# Patient Record
Sex: MALE | Race: WHITE | Employment: OTHER | URBAN - METROPOLITAN AREA
[De-identification: names, ages, dates, MRNs, and addresses within clinical notes are randomized per-mention and may not be internally consistent; named-entity substitution may affect disease eponyms.]

---

## 2017-03-08 PROCEDURE — 36415 COLL VENOUS BLD VENIPUNCTURE: CPT | Performed by: INTERNAL MEDICINE

## 2017-03-08 PROCEDURE — 85025 COMPLETE CBC W/AUTO DIFF WBC: CPT | Performed by: INTERNAL MEDICINE

## 2017-03-08 PROCEDURE — 80061 LIPID PANEL: CPT | Performed by: INTERNAL MEDICINE

## 2017-03-08 PROCEDURE — 80053 COMPREHEN METABOLIC PANEL: CPT | Performed by: INTERNAL MEDICINE

## 2017-03-08 PROCEDURE — 84439 ASSAY OF FREE THYROXINE: CPT | Performed by: INTERNAL MEDICINE

## 2017-03-08 PROCEDURE — G0103 PSA SCREENING: HCPCS | Performed by: INTERNAL MEDICINE

## 2017-03-08 PROCEDURE — 84443 ASSAY THYROID STIM HORMONE: CPT | Performed by: INTERNAL MEDICINE

## 2017-08-25 PROCEDURE — 82784 ASSAY IGA/IGD/IGG/IGM EACH: CPT | Performed by: INTERNAL MEDICINE

## 2017-08-25 PROCEDURE — 89055 LEUKOCYTE ASSESSMENT FECAL: CPT | Performed by: INTERNAL MEDICINE

## 2017-08-25 PROCEDURE — 87045 FECES CULTURE AEROBIC BACT: CPT | Performed by: INTERNAL MEDICINE

## 2017-08-25 PROCEDURE — 87493 C DIFF AMPLIFIED PROBE: CPT | Performed by: INTERNAL MEDICINE

## 2017-08-25 PROCEDURE — 87046 STOOL CULTR AEROBIC BACT EA: CPT | Performed by: INTERNAL MEDICINE

## 2017-08-25 PROCEDURE — 86255 FLUORESCENT ANTIBODY SCREEN: CPT | Performed by: INTERNAL MEDICINE

## 2017-08-25 PROCEDURE — 83516 IMMUNOASSAY NONANTIBODY: CPT | Performed by: INTERNAL MEDICINE

## 2017-08-25 PROCEDURE — 87427 SHIGA-LIKE TOXIN AG IA: CPT | Performed by: INTERNAL MEDICINE

## 2017-08-28 PROBLEM — R19.7 DIARRHEA: Status: ACTIVE | Noted: 2017-08-28

## 2017-10-17 PROCEDURE — 81001 URINALYSIS AUTO W/SCOPE: CPT | Performed by: INTERNAL MEDICINE

## 2017-10-17 PROCEDURE — 80074 ACUTE HEPATITIS PANEL: CPT | Performed by: INTERNAL MEDICINE

## 2017-10-18 PROCEDURE — 87177 OVA AND PARASITES SMEARS: CPT | Performed by: INTERNAL MEDICINE

## 2017-10-18 PROCEDURE — 87045 FECES CULTURE AEROBIC BACT: CPT | Performed by: INTERNAL MEDICINE

## 2017-10-18 PROCEDURE — 87209 SMEAR COMPLEX STAIN: CPT | Performed by: INTERNAL MEDICINE

## 2017-10-18 PROCEDURE — 87272 CRYPTOSPORIDIUM AG IF: CPT | Performed by: INTERNAL MEDICINE

## 2017-10-18 PROCEDURE — 82705 FATS/LIPIDS FECES QUAL: CPT | Performed by: INTERNAL MEDICINE

## 2017-10-18 PROCEDURE — 87427 SHIGA-LIKE TOXIN AG IA: CPT | Performed by: INTERNAL MEDICINE

## 2017-10-18 PROCEDURE — 87329 GIARDIA AG IA: CPT | Performed by: INTERNAL MEDICINE

## 2017-10-18 PROCEDURE — 87046 STOOL CULTR AEROBIC BACT EA: CPT | Performed by: INTERNAL MEDICINE

## 2017-10-18 RX ORDER — PSEUDOEPHEDRINE HYDROCHLORIDE 30 MG/1
30 TABLET ORAL EVERY 4 HOURS PRN
COMMUNITY
End: 2017-11-01

## 2017-10-18 RX ORDER — FEXOFENADINE HCL 180 MG/1
180 TABLET ORAL DAILY
COMMUNITY
End: 2017-11-01

## 2017-10-22 NOTE — H&P
H and P Update    The history obtained by Dr. Berenice Vincent dated 10/17/17, has no changes.     GENERAL: well developed, well nourished, in no apparent distress  HEENT: atraumatic, normocephalic, ears and throat are clear  NECK: supple,

## 2017-10-22 NOTE — OPERATIVE REPORT
PATIENT NAME: Chance Summers  MRN: [id number]  DATE OF OPERATION: 10/23/2017  REFERRING PHYSICIAN: Dr. Anne Magaña  Medications:  Versed 6 mg IVP              Fentanyl 100 mcg IVP  DATE OF LAST COLONOSCOPY:  2014  PREOPERATIVE DIAGNOSIS                Diarrhea endoscopic findings. 2. The patient will be notified with biopsy results in 1 - 2 weeks. 3. May use imodium as needed. Continue fiber supplementation. 4. Follow up with Dr. Ian Cho if diarrhea persists.     Sav Farias MD, Wu Saucedo

## 2017-10-23 ENCOUNTER — HOSPITAL ENCOUNTER (OUTPATIENT)
Facility: HOSPITAL | Age: 72
Setting detail: HOSPITAL OUTPATIENT SURGERY
Discharge: HOME | End: 2017-10-23
Attending: INTERNAL MEDICINE | Admitting: INTERNAL MEDICINE
Payer: MEDICARE

## 2017-10-23 ENCOUNTER — SURGERY (OUTPATIENT)
Age: 72
End: 2017-10-23

## 2017-10-23 VITALS
BODY MASS INDEX: 24.64 KG/M2 | DIASTOLIC BLOOD PRESSURE: 83 MMHG | RESPIRATION RATE: 18 BRPM | TEMPERATURE: 99 F | HEIGHT: 71 IN | WEIGHT: 176 LBS | SYSTOLIC BLOOD PRESSURE: 114 MMHG | OXYGEN SATURATION: 95 % | HEART RATE: 40 BPM

## 2017-10-23 DIAGNOSIS — R19.7 DIARRHEA, UNSPECIFIED TYPE: ICD-10-CM

## 2017-10-23 PROCEDURE — 0DBE8ZX EXCISION OF LARGE INTESTINE, VIA NATURAL OR ARTIFICIAL OPENING ENDOSCOPIC, DIAGNOSTIC: ICD-10-PCS | Performed by: INTERNAL MEDICINE

## 2017-10-23 PROCEDURE — 88305 TISSUE EXAM BY PATHOLOGIST: CPT | Performed by: INTERNAL MEDICINE

## 2017-10-23 RX ORDER — MIDAZOLAM HYDROCHLORIDE 1 MG/ML
INJECTION INTRAMUSCULAR; INTRAVENOUS
Status: DISCONTINUED | OUTPATIENT
Start: 2017-10-23 | End: 2017-10-23

## 2017-10-23 RX ORDER — SODIUM CHLORIDE, SODIUM LACTATE, POTASSIUM CHLORIDE, CALCIUM CHLORIDE 600; 310; 30; 20 MG/100ML; MG/100ML; MG/100ML; MG/100ML
INJECTION, SOLUTION INTRAVENOUS CONTINUOUS
Status: DISCONTINUED | OUTPATIENT
Start: 2017-10-23 | End: 2017-10-23

## 2017-10-23 NOTE — PROGRESS NOTES
Dr. Marilee Norman here, MD saw elevated b/p readings. MD told wife not to worry and that he should follow up with primary care about it. MD ordered to discharge the pt home.

## 2017-10-23 NOTE — BRIEF OP NOTE
Pre-Operative Diagnosis: Diarrhea, unspecified type [R19.7]     Post-Operative Diagnosis: normal colon     Procedure Performed:   Procedure(s):  colonoscopy w bx    Surgeon(s) and Role:     * Marcia Small MD - Primary    Assistant(s):        Suhas Colon

## 2017-10-31 NOTE — PROGRESS NOTES
Results reviewed with pt on 10/30. Lymphocytic colitis may be related to drugs - nsaids and ppi's stopped. Pt remains on statin. This type of colitis may also be seen after severe infectious colitis - which pt had on recent trip to [facility].     Discussed

## 2017-11-01 PROCEDURE — 86753 PROTOZOA ANTIBODY NOS: CPT | Performed by: INTERNAL MEDICINE

## 2017-11-02 PROCEDURE — 87177 OVA AND PARASITES SMEARS: CPT | Performed by: INTERNAL MEDICINE

## 2017-11-02 PROCEDURE — 87272 CRYPTOSPORIDIUM AG IF: CPT | Performed by: INTERNAL MEDICINE

## 2017-11-02 PROCEDURE — 87015 SPECIMEN INFECT AGNT CONCNTJ: CPT | Performed by: INTERNAL MEDICINE

## 2017-11-02 PROCEDURE — 87046 STOOL CULTR AEROBIC BACT EA: CPT | Performed by: INTERNAL MEDICINE

## 2017-11-02 PROCEDURE — 89055 LEUKOCYTE ASSESSMENT FECAL: CPT | Performed by: INTERNAL MEDICINE

## 2017-11-02 PROCEDURE — 36415 COLL VENOUS BLD VENIPUNCTURE: CPT | Performed by: INTERNAL MEDICINE

## 2017-11-02 PROCEDURE — 87209 SMEAR COMPLEX STAIN: CPT | Performed by: INTERNAL MEDICINE

## 2017-11-02 PROCEDURE — 87207 SMEAR SPECIAL STAIN: CPT | Performed by: INTERNAL MEDICINE

## 2017-11-02 PROCEDURE — 87493 C DIFF AMPLIFIED PROBE: CPT | Performed by: INTERNAL MEDICINE

## 2017-11-02 PROCEDURE — 87045 FECES CULTURE AEROBIC BACT: CPT | Performed by: INTERNAL MEDICINE

## 2017-11-02 PROCEDURE — 87329 GIARDIA AG IA: CPT | Performed by: INTERNAL MEDICINE

## 2017-11-20 PROBLEM — A04.72 C. DIFFICILE COLITIS: Status: ACTIVE | Noted: 2017-11-20

## 2017-12-12 PROBLEM — K21.9 GASTROESOPHAGEAL REFLUX DISEASE WITHOUT ESOPHAGITIS: Status: ACTIVE | Noted: 2017-12-12

## 2017-12-12 PROBLEM — N18.30 CKD (CHRONIC KIDNEY DISEASE) STAGE 3, GFR 30-59 ML/MIN (HCC): Status: ACTIVE | Noted: 2017-12-12

## 2017-12-12 PROBLEM — R80.1 PERSISTENT PROTEINURIA: Status: ACTIVE | Noted: 2017-12-12

## 2017-12-12 PROBLEM — K52.832 LYMPHOCYTIC COLITIS: Status: ACTIVE | Noted: 2017-12-12

## 2017-12-12 PROBLEM — N17.9 AKI (ACUTE KIDNEY INJURY) (HCC): Status: ACTIVE | Noted: 2017-12-12

## 2017-12-12 PROBLEM — Z79.1 NSAID LONG-TERM USE: Status: ACTIVE | Noted: 2017-12-12

## 2017-12-12 PROBLEM — E55.9 HYPOVITAMINOSIS D: Status: ACTIVE | Noted: 2017-12-12

## 2017-12-12 PROBLEM — N18.2 CKD (CHRONIC KIDNEY DISEASE) STAGE 2, GFR 60-89 ML/MIN: Status: ACTIVE | Noted: 2017-12-12

## 2017-12-13 PROCEDURE — 82570 ASSAY OF URINE CREATININE: CPT | Performed by: INTERNAL MEDICINE

## 2017-12-13 PROCEDURE — 81003 URINALYSIS AUTO W/O SCOPE: CPT | Performed by: INTERNAL MEDICINE

## 2017-12-13 PROCEDURE — 82043 UR ALBUMIN QUANTITATIVE: CPT | Performed by: INTERNAL MEDICINE

## 2017-12-13 PROCEDURE — 84156 ASSAY OF PROTEIN URINE: CPT | Performed by: INTERNAL MEDICINE

## 2018-03-12 PROCEDURE — 81003 URINALYSIS AUTO W/O SCOPE: CPT | Performed by: INTERNAL MEDICINE

## 2018-03-12 PROCEDURE — 36415 COLL VENOUS BLD VENIPUNCTURE: CPT | Performed by: INTERNAL MEDICINE

## 2018-03-12 PROCEDURE — 82570 ASSAY OF URINE CREATININE: CPT | Performed by: INTERNAL MEDICINE

## 2018-03-12 PROCEDURE — 82043 UR ALBUMIN QUANTITATIVE: CPT | Performed by: INTERNAL MEDICINE
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
## 2018-05-01 ENCOUNTER — HOSPITAL ENCOUNTER (OUTPATIENT)
Dept: GENERAL RADIOLOGY | Age: 73
Discharge: HOME | End: 2018-05-01
Attending: ORTHOPAEDIC SURGERY
Payer: MEDICARE
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
## 2018-05-01 DIAGNOSIS — Z01.89 ENCOUNTER FOR LOWER EXTREMITY COMPARISON IMAGING STUDY: ICD-10-CM

## 2018-05-01 DIAGNOSIS — M25.562 LEFT KNEE PAIN: ICD-10-CM

## 2018-05-01 PROCEDURE — 73562 X-RAY EXAM OF KNEE 3: CPT | Performed by: ORTHOPAEDIC SURGERY

## 2018-05-01 PROCEDURE — 73630 X-RAY EXAM OF FOOT: CPT | Performed by: ORTHOPAEDIC SURGERY

## 2018-05-01 PROCEDURE — 73501 X-RAY EXAM HIP UNI 1 VIEW: CPT | Performed by: ORTHOPAEDIC SURGERY

## 2018-05-01 PROCEDURE — 73564 X-RAY EXAM KNEE 4 OR MORE: CPT | Performed by: ORTHOPAEDIC SURGERY

## 2018-05-21 ENCOUNTER — HOSPITAL ENCOUNTER (OUTPATIENT)
Dept: GENERAL RADIOLOGY | Facility: HOSPITAL | Age: 73
Discharge: HOME | End: 2018-05-21
Attending: ORTHOPAEDIC SURGERY
Payer: MEDICARE

## 2018-05-21 DIAGNOSIS — M79.671 RIGHT FOOT PAIN: ICD-10-CM
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
## 2018-05-21 DIAGNOSIS — M16.12 PRIMARY OSTEOARTHRITIS OF LEFT HIP: ICD-10-CM

## 2018-05-21 DIAGNOSIS — M17.12 PRIMARY OSTEOARTHRITIS OF LEFT KNEE: ICD-10-CM

## 2018-05-21 PROCEDURE — 20610 DRAIN/INJ JOINT/BURSA W/O US: CPT | Performed by: ORTHOPAEDIC SURGERY

## 2018-05-21 PROCEDURE — 77002 NEEDLE LOCALIZATION BY XRAY: CPT | Performed by: ORTHOPAEDIC SURGERY

## 2018-05-21 RX ORDER — METHYLPREDNISOLONE ACETATE 80 MG/ML
INJECTION, SUSPENSION INTRA-ARTICULAR; INTRALESIONAL; INTRAMUSCULAR; SOFT TISSUE
Status: COMPLETED
Start: 2018-05-21 | End: 2018-05-21

## 2018-05-21 RX ORDER — ROPIVACAINE HYDROCHLORIDE 5 MG/ML
INJECTION, SOLUTION EPIDURAL; INFILTRATION; PERINEURAL
Status: DISPENSED
Start: 2018-05-21 | End: 2018-05-21

## 2018-05-21 RX ADMIN — METHYLPREDNISOLONE ACETATE: 80 INJECTION, SUSPENSION INTRA-ARTICULAR; INTRALESIONAL; INTRAMUSCULAR; SOFT TISSUE at 08:30:00

## 2018-06-29 PROBLEM — N17.9 AKI (ACUTE KIDNEY INJURY) (HCC): Status: RESOLVED | Noted: 2017-12-12 | Resolved: 2018-06-29

## 2018-07-16 ENCOUNTER — LABORATORY ENCOUNTER (OUTPATIENT)
Dept: LAB | Facility: HOSPITAL | Age: 73
End: 2018-07-16
Attending: ORTHOPAEDIC SURGERY
Payer: MEDICARE

## 2018-07-16 ENCOUNTER — HOSPITAL ENCOUNTER (OUTPATIENT)
Dept: PHYSICAL THERAPY | Facility: HOSPITAL | Age: 73
Discharge: HOME | End: 2018-07-16
Attending: ORTHOPAEDIC SURGERY
Payer: MEDICARE

## 2018-07-16 DIAGNOSIS — Z01.818 PREOPERATIVE EXAMINATION: ICD-10-CM

## 2018-07-16 DIAGNOSIS — M16.12 PRIMARY OSTEOARTHRITIS OF LEFT HIP: ICD-10-CM

## 2018-07-16 DIAGNOSIS — N18.30 CKD (CHRONIC KIDNEY DISEASE) STAGE 3, GFR 30-59 ML/MIN (HCC): ICD-10-CM

## 2018-07-16 DIAGNOSIS — I10 ESSENTIAL HYPERTENSION: ICD-10-CM

## 2018-07-16 DIAGNOSIS — R80.1 PERSISTENT PROTEINURIA: ICD-10-CM

## 2018-07-16 LAB
ALBUMIN SERPL-MCNC: 3.6 G/DL (ref 3.5–4.8)
ALP LIVER SERPL-CCNC: 86 U/L (ref 45–117)
ALT SERPL-CCNC: 28 U/L (ref 17–63)
ANTIBODY SCREEN: NEGATIVE
AST SERPL-CCNC: 20 U/L (ref 15–41)
BASOPHILS # BLD AUTO: 0.03 X10(3) UL (ref 0–0.1)
BASOPHILS NFR BLD AUTO: 0.4 %
BILIRUB SERPL-MCNC: 0.4 MG/DL (ref 0.1–2)
BILIRUB UR QL STRIP.AUTO: NEGATIVE
BUN BLD-MCNC: 30 MG/DL (ref 8–20)
CALCIUM BLD-MCNC: 9.3 MG/DL (ref 8.3–10.3)
CHLORIDE: 106 MMOL/L (ref 101–111)
CLARITY UR REFRACT.AUTO: CLEAR
CO2: 29 MMOL/L (ref 22–32)
COLOR UR AUTO: YELLOW
CREAT BLD-MCNC: 1.47 MG/DL (ref 0.7–1.3)
CREAT UR-SCNC: 95.9 MG/DL
EOSINOPHIL # BLD AUTO: 0.22 X10(3) UL (ref 0–0.3)
EOSINOPHIL NFR BLD AUTO: 3.1 %
ERYTHROCYTE [DISTWIDTH] IN BLOOD BY AUTOMATED COUNT: 13.9 % (ref 11.5–16)
GLUCOSE BLD-MCNC: 87 MG/DL (ref 70–99)
GLUCOSE UR STRIP.AUTO-MCNC: NEGATIVE MG/DL
HCT VFR BLD AUTO: 44.2 % (ref 37–53)
HGB BLD-MCNC: 14.7 G/DL (ref 13–17)
IMMATURE GRANULOCYTE COUNT: 0.02 X10(3) UL (ref 0–1)
IMMATURE GRANULOCYTE RATIO %: 0.3 %
KETONES UR STRIP.AUTO-MCNC: NEGATIVE MG/DL
LEUKOCYTE ESTERASE UR QL STRIP.AUTO: NEGATIVE
LYMPHOCYTES # BLD AUTO: 1.46 X10(3) UL (ref 0.9–4)
LYMPHOCYTES NFR BLD AUTO: 20.5 %
M PROTEIN MFR SERPL ELPH: 7.6 G/DL (ref 6.1–8.3)
MCH RBC QN AUTO: 32.1 PG (ref 27–33.2)
MCHC RBC AUTO-ENTMCNC: 33.3 G/DL (ref 31–37)
MCV RBC AUTO: 96.5 FL (ref 80–99)
MONOCYTES # BLD AUTO: 0.66 X10(3) UL (ref 0.1–1)
MONOCYTES NFR BLD AUTO: 9.3 %
NEUTROPHIL ABS PRELIM: 4.72 X10 (3) UL (ref 1.3–6.7)
NEUTROPHILS # BLD AUTO: 4.72 X10(3) UL (ref 1.3–6.7)
NEUTROPHILS NFR BLD AUTO: 66.4 %
NITRITE UR QL STRIP.AUTO: NEGATIVE
PH UR STRIP.AUTO: 6 [PH] (ref 4.5–8)
PLATELET # BLD AUTO: 277 10(3)UL (ref 150–450)
POTASSIUM SERPL-SCNC: 3.7 MMOL/L (ref 3.6–5.1)
PROT UR STRIP.AUTO-MCNC: NEGATIVE MG/DL
PROT UR-MCNC: 15.5 MG/DL
RBC # BLD AUTO: 4.58 X10(6)UL (ref 3.8–5.8)
RBC UR QL AUTO: NEGATIVE
RED CELL DISTRIBUTION WIDTH-SD: 49.3 FL (ref 35.1–46.3)
RH BLOOD TYPE: NEGATIVE
SODIUM SERPL-SCNC: 142 MMOL/L (ref 136–144)
SP GR UR STRIP.AUTO: 1.01 (ref 1–1.03)
URINE PROTEIN/CREATININE RATIO, RANDOM: 0.16
UROBILINOGEN UR STRIP.AUTO-MCNC: <2 MG/DL
WBC # BLD AUTO: 7.1 X10(3) UL (ref 4–13)

## 2018-07-16 PROCEDURE — 87081 CULTURE SCREEN ONLY: CPT

## 2018-07-16 PROCEDURE — 36415 COLL VENOUS BLD VENIPUNCTURE: CPT

## 2018-07-16 PROCEDURE — 85025 COMPLETE CBC W/AUTO DIFF WBC: CPT

## 2018-07-16 PROCEDURE — 86900 BLOOD TYPING SEROLOGIC ABO: CPT

## 2018-07-16 PROCEDURE — 82570 ASSAY OF URINE CREATININE: CPT

## 2018-07-16 PROCEDURE — 86850 RBC ANTIBODY SCREEN: CPT

## 2018-07-16 PROCEDURE — 80053 COMPREHEN METABOLIC PANEL: CPT

## 2018-07-16 PROCEDURE — 84156 ASSAY OF PROTEIN URINE: CPT

## 2018-07-16 PROCEDURE — 81003 URINALYSIS AUTO W/O SCOPE: CPT

## 2018-07-16 PROCEDURE — 86901 BLOOD TYPING SEROLOGIC RH(D): CPT

## 2018-07-17 ENCOUNTER — ANESTHESIA EVENT (OUTPATIENT)
Dept: SURGERY | Facility: HOSPITAL | Age: 73
DRG: 470 | End: 2018-07-17
Payer: MEDICARE

## 2018-07-18 NOTE — PROGRESS NOTES
Results released to [facility] with providers notes previously  Pt has viewed [facility] results

## 2018-07-26 ENCOUNTER — APPOINTMENT (OUTPATIENT)
Dept: GENERAL RADIOLOGY | Facility: HOSPITAL | Age: 73
DRG: 470 | End: 2018-07-26
Attending: ORTHOPAEDIC SURGERY
Payer: MEDICARE

## 2018-07-26 ENCOUNTER — ANESTHESIA (OUTPATIENT)
Dept: SURGERY | Facility: HOSPITAL | Age: 73
DRG: 470 | End: 2018-07-26
Payer: MEDICARE

## 2018-07-26 ENCOUNTER — SURGERY (OUTPATIENT)
Age: 73
End: 2018-07-26

## 2018-07-26 ENCOUNTER — HOSPITAL ENCOUNTER (INPATIENT)
Facility: HOSPITAL | Age: 73
LOS: 2 days | Discharge: HOME HEALTH | DRG: 470 | End: 2018-07-28
Attending: ORTHOPAEDIC SURGERY | Admitting: ORTHOPAEDIC SURGERY
Payer: MEDICARE

## 2018-07-26 DIAGNOSIS — M16.12 PRIMARY OSTEOARTHRITIS OF LEFT HIP: Primary | ICD-10-CM

## 2018-07-26 LAB
CREAT BLD-MCNC: 1.5 MG/DL (ref 0.7–1.3)
T4 FREE SERPL-MCNC: 1 NG/DL (ref 0.9–1.8)
TSI SER-ACNC: 1.8 MIU/ML (ref 0.35–5.5)

## 2018-07-26 PROCEDURE — 88311 DECALCIFY TISSUE: CPT | Performed by: ORTHOPAEDIC SURGERY

## 2018-07-26 PROCEDURE — 88304 TISSUE EXAM BY PATHOLOGIST: CPT | Performed by: ORTHOPAEDIC SURGERY

## 2018-07-26 PROCEDURE — 0SRB04Z REPLACEMENT OF LEFT HIP JOINT WITH CERAMIC ON POLYETHYLENE SYNTHETIC SUBSTITUTE, OPEN APPROACH: ICD-10-PCS | Performed by: ORTHOPAEDIC SURGERY

## 2018-07-26 PROCEDURE — 97116 GAIT TRAINING THERAPY: CPT

## 2018-07-26 PROCEDURE — 84439 ASSAY OF FREE THYROXINE: CPT | Performed by: HOSPITALIST

## 2018-07-26 PROCEDURE — 3E0T3BZ INTRODUCTION OF ANESTHETIC AGENT INTO PERIPHERAL NERVES AND PLEXI, PERCUTANEOUS APPROACH: ICD-10-PCS | Performed by: ANESTHESIOLOGY

## 2018-07-26 PROCEDURE — 76942 ECHO GUIDE FOR BIOPSY: CPT | Performed by: ORTHOPAEDIC SURGERY

## 2018-07-26 PROCEDURE — 82565 ASSAY OF CREATININE: CPT | Performed by: PHYSICIAN ASSISTANT

## 2018-07-26 PROCEDURE — 84443 ASSAY THYROID STIM HORMONE: CPT | Performed by: HOSPITALIST

## 2018-07-26 PROCEDURE — 97161 PT EVAL LOW COMPLEX 20 MIN: CPT

## 2018-07-26 PROCEDURE — 73501 X-RAY EXAM HIP UNI 1 VIEW: CPT | Performed by: ORTHOPAEDIC SURGERY

## 2018-07-26 DEVICE — APEX HOLE ELIMINATOR - PS
Type: IMPLANTABLE DEVICE | Site: HIP | Status: FUNCTIONAL
Brand: APEX

## 2018-07-26 DEVICE — CORAIL HIP SYSTEM CEMENTLESS FEMORAL STEM 12/14 AMT 135 DEGREES KS SIZE 13 HA COATED STANDARD NO COLLAR
Type: IMPLANTABLE DEVICE | Site: HIP | Status: FUNCTIONAL
Brand: CORAIL

## 2018-07-26 DEVICE — PINNACLE HIP SOLUTIONS ALTRX POLYETHYLENE ACETABULAR LINER +4 NEUTRAL 36MM ID 56MM OD
Type: IMPLANTABLE DEVICE | Site: HIP | Status: FUNCTIONAL
Brand: PINNACLE ALTRX

## 2018-07-26 DEVICE — PINNACLE 100 ACETABULAR SHELL GRIPTION 100 56MM OD
Type: IMPLANTABLE DEVICE | Site: HIP | Status: FUNCTIONAL
Brand: PINNACLE GRIPTION

## 2018-07-26 DEVICE — BIOLOX DELTA CERAMIC FEMORAL HEAD +5.0 36MM DIA 12/14 TAPER
Type: IMPLANTABLE DEVICE | Site: HIP | Status: FUNCTIONAL
Brand: BIOLOX DELTA

## 2018-07-26 RX ORDER — NALOXONE HYDROCHLORIDE 0.4 MG/ML
80 INJECTION, SOLUTION INTRAMUSCULAR; INTRAVENOUS; SUBCUTANEOUS AS NEEDED
Status: DISCONTINUED | OUTPATIENT
Start: 2018-07-26 | End: 2018-07-26 | Stop reason: HOSPADM

## 2018-07-26 RX ORDER — MELATONIN
325
Status: DISCONTINUED | OUTPATIENT
Start: 2018-07-26 | End: 2018-07-28

## 2018-07-26 RX ORDER — BISACODYL 10 MG
10 SUPPOSITORY, RECTAL RECTAL
Status: DISCONTINUED | OUTPATIENT
Start: 2018-07-26 | End: 2018-07-28

## 2018-07-26 RX ORDER — DOCUSATE SODIUM 100 MG/1
100 CAPSULE, LIQUID FILLED ORAL 2 TIMES DAILY
Status: DISCONTINUED | OUTPATIENT
Start: 2018-07-26 | End: 2018-07-28

## 2018-07-26 RX ORDER — SODIUM CHLORIDE, SODIUM LACTATE, POTASSIUM CHLORIDE, CALCIUM CHLORIDE 600; 310; 30; 20 MG/100ML; MG/100ML; MG/100ML; MG/100ML
INJECTION, SOLUTION INTRAVENOUS CONTINUOUS
Status: DISCONTINUED | OUTPATIENT
Start: 2018-07-26 | End: 2018-07-27

## 2018-07-26 RX ORDER — AMLODIPINE BESYLATE 5 MG/1
10 TABLET ORAL DAILY
Status: DISCONTINUED | OUTPATIENT
Start: 2018-07-27 | End: 2018-07-28

## 2018-07-26 RX ORDER — FAMOTIDINE 20 MG/1
20 TABLET ORAL DAILY
Status: DISCONTINUED | OUTPATIENT
Start: 2018-07-27 | End: 2018-07-28

## 2018-07-26 RX ORDER — SODIUM CHLORIDE, SODIUM LACTATE, POTASSIUM CHLORIDE, CALCIUM CHLORIDE 600; 310; 30; 20 MG/100ML; MG/100ML; MG/100ML; MG/100ML
INJECTION, SOLUTION INTRAVENOUS CONTINUOUS
Status: DISCONTINUED | OUTPATIENT
Start: 2018-07-26 | End: 2018-07-26 | Stop reason: HOSPADM

## 2018-07-26 RX ORDER — DIPHENHYDRAMINE HYDROCHLORIDE 50 MG/ML
25 INJECTION INTRAMUSCULAR; INTRAVENOUS ONCE AS NEEDED
Status: ACTIVE | OUTPATIENT
Start: 2018-07-26 | End: 2018-07-26

## 2018-07-26 RX ORDER — ZOLPIDEM TARTRATE 5 MG/1
5 TABLET ORAL NIGHTLY PRN
Status: DISCONTINUED | OUTPATIENT
Start: 2018-07-26 | End: 2018-07-27

## 2018-07-26 RX ORDER — DEXAMETHASONE SODIUM PHOSPHATE 4 MG/ML
4 VIAL (ML) INJECTION AS NEEDED
Status: DISCONTINUED | OUTPATIENT
Start: 2018-07-26 | End: 2018-07-26 | Stop reason: HOSPADM

## 2018-07-26 RX ORDER — ONDANSETRON 2 MG/ML
4 INJECTION INTRAMUSCULAR; INTRAVENOUS EVERY 4 HOURS PRN
Status: DISPENSED | OUTPATIENT
Start: 2018-07-26 | End: 2018-07-28

## 2018-07-26 RX ORDER — OXYCODONE HYDROCHLORIDE 15 MG/1
15 TABLET ORAL EVERY 4 HOURS PRN
Status: DISCONTINUED | OUTPATIENT
Start: 2018-07-26 | End: 2018-07-27

## 2018-07-26 RX ORDER — ATORVASTATIN CALCIUM 40 MG/1
80 TABLET, FILM COATED ORAL NIGHTLY
Status: DISCONTINUED | OUTPATIENT
Start: 2018-07-26 | End: 2018-07-28

## 2018-07-26 RX ORDER — CEFAZOLIN SODIUM/WATER 2 G/20 ML
2 SYRINGE (ML) INTRAVENOUS EVERY 8 HOURS
Status: COMPLETED | OUTPATIENT
Start: 2018-07-26 | End: 2018-07-26

## 2018-07-26 RX ORDER — METOCLOPRAMIDE HYDROCHLORIDE 5 MG/ML
10 INJECTION INTRAMUSCULAR; INTRAVENOUS AS NEEDED
Status: DISCONTINUED | OUTPATIENT
Start: 2018-07-26 | End: 2018-07-26 | Stop reason: HOSPADM

## 2018-07-26 RX ORDER — ACETAMINOPHEN 325 MG/1
TABLET ORAL
Status: COMPLETED
Start: 2018-07-26 | End: 2018-07-26

## 2018-07-26 RX ORDER — TIZANIDINE 2 MG/1
2 TABLET ORAL 3 TIMES DAILY PRN
Status: DISCONTINUED | OUTPATIENT
Start: 2018-07-26 | End: 2018-07-28

## 2018-07-26 RX ORDER — ACETAMINOPHEN 325 MG/1
650 TABLET ORAL 4 TIMES DAILY
Status: DISPENSED | OUTPATIENT
Start: 2018-07-26 | End: 2018-07-28

## 2018-07-26 RX ORDER — SODIUM CHLORIDE 9 MG/ML
INJECTION, SOLUTION INTRAVENOUS CONTINUOUS
Status: DISCONTINUED | OUTPATIENT
Start: 2018-07-26 | End: 2018-07-27

## 2018-07-26 RX ORDER — MORPHINE SULFATE 4 MG/ML
2 INJECTION, SOLUTION INTRAMUSCULAR; INTRAVENOUS EVERY 2 HOUR PRN
Status: DISCONTINUED | OUTPATIENT
Start: 2018-07-26 | End: 2018-07-28

## 2018-07-26 RX ORDER — OXYCODONE HYDROCHLORIDE 5 MG/1
5 TABLET ORAL EVERY 4 HOURS PRN
Status: DISCONTINUED | OUTPATIENT
Start: 2018-07-26 | End: 2018-07-27

## 2018-07-26 RX ORDER — SODIUM PHOSPHATE, DIBASIC AND SODIUM PHOSPHATE, MONOBASIC 7; 19 G/133ML; G/133ML
1 ENEMA RECTAL ONCE AS NEEDED
Status: DISCONTINUED | OUTPATIENT
Start: 2018-07-26 | End: 2018-07-28

## 2018-07-26 RX ORDER — MORPHINE SULFATE 4 MG/ML
2 INJECTION, SOLUTION INTRAMUSCULAR; INTRAVENOUS EVERY 5 MIN PRN
Status: DISCONTINUED | OUTPATIENT
Start: 2018-07-26 | End: 2018-07-26 | Stop reason: HOSPADM

## 2018-07-26 RX ORDER — OXYCODONE HYDROCHLORIDE 10 MG/1
10 TABLET ORAL EVERY 4 HOURS PRN
Status: DISCONTINUED | OUTPATIENT
Start: 2018-07-26 | End: 2018-07-27

## 2018-07-26 RX ORDER — DIPHENHYDRAMINE HCL 25 MG
25 CAPSULE ORAL EVERY 4 HOURS PRN
Status: DISCONTINUED | OUTPATIENT
Start: 2018-07-26 | End: 2018-07-28

## 2018-07-26 RX ORDER — ACETAMINOPHEN 500 MG
1000 TABLET ORAL ONCE
Status: DISCONTINUED | OUTPATIENT
Start: 2018-07-26 | End: 2018-07-26 | Stop reason: HOSPADM

## 2018-07-26 RX ORDER — MORPHINE SULFATE 4 MG/ML
4 INJECTION, SOLUTION INTRAMUSCULAR; INTRAVENOUS EVERY 2 HOUR PRN
Status: DISCONTINUED | OUTPATIENT
Start: 2018-07-26 | End: 2018-07-28

## 2018-07-26 RX ORDER — METOCLOPRAMIDE HYDROCHLORIDE 5 MG/ML
10 INJECTION INTRAMUSCULAR; INTRAVENOUS EVERY 6 HOURS PRN
Status: ACTIVE | OUTPATIENT
Start: 2018-07-26 | End: 2018-07-28

## 2018-07-26 RX ORDER — LOSARTAN POTASSIUM 100 MG/1
100 TABLET ORAL DAILY
Status: DISCONTINUED | OUTPATIENT
Start: 2018-07-27 | End: 2018-07-28

## 2018-07-26 RX ORDER — ONDANSETRON 2 MG/ML
4 INJECTION INTRAMUSCULAR; INTRAVENOUS AS NEEDED
Status: DISCONTINUED | OUTPATIENT
Start: 2018-07-26 | End: 2018-07-26 | Stop reason: HOSPADM

## 2018-07-26 RX ORDER — HYDROCODONE BITARTRATE AND ACETAMINOPHEN 5; 325 MG/1; MG/1
1 TABLET ORAL AS NEEDED
Status: DISCONTINUED | OUTPATIENT
Start: 2018-07-26 | End: 2018-07-26 | Stop reason: HOSPADM

## 2018-07-26 RX ORDER — MORPHINE SULFATE 4 MG/ML
1 INJECTION, SOLUTION INTRAMUSCULAR; INTRAVENOUS EVERY 2 HOUR PRN
Status: DISCONTINUED | OUTPATIENT
Start: 2018-07-26 | End: 2018-07-28

## 2018-07-26 RX ORDER — POLYETHYLENE GLYCOL 3350 17 G/17G
17 POWDER, FOR SOLUTION ORAL DAILY PRN
Status: DISCONTINUED | OUTPATIENT
Start: 2018-07-26 | End: 2018-07-28

## 2018-07-26 RX ORDER — CEFAZOLIN SODIUM/WATER 2 G/20 ML
2 SYRINGE (ML) INTRAVENOUS ONCE
Status: COMPLETED | OUTPATIENT
Start: 2018-07-26 | End: 2018-07-26

## 2018-07-26 RX ORDER — HYDROCODONE BITARTRATE AND ACETAMINOPHEN 5; 325 MG/1; MG/1
2 TABLET ORAL AS NEEDED
Status: DISCONTINUED | OUTPATIENT
Start: 2018-07-26 | End: 2018-07-26 | Stop reason: HOSPADM

## 2018-07-26 RX ORDER — DIPHENHYDRAMINE HYDROCHLORIDE 50 MG/ML
12.5 INJECTION INTRAMUSCULAR; INTRAVENOUS EVERY 4 HOURS PRN
Status: DISCONTINUED | OUTPATIENT
Start: 2018-07-26 | End: 2018-07-28

## 2018-07-26 NOTE — PLAN OF CARE
Patient arrived to unit from PACU in bed. Fall and safety precautions in place. Welcomed and oriented to unit and POC. Spouse at bedside. Advancing diet, pain control, fall prevention, PT/OT discussed. Abductor pillow in place.  Patient with HR in the 40's

## 2018-07-26 NOTE — H&P
Delano Perea   7/5/2018 10:00 AM   Office Visit   MRN:  [id number]   Description: 74year old male Provider: Rich Peck MD Department: [facility]     Click to print [address] for [facility] Comment: Procedure: COLONOSCOPY;  Surgeon: Kelly Calderon MD;  Location: [facility] ENDOSCOPY  No date: HERNIA SURGERY  No date: MELANOMA MONITOR PROFILE  2010: OTHER SURGICAL HISTORY      Comment: Rt shoulder RCR  2009: OTHER SURGICAL HISTORY Helene Madera has stable history of cardiac or pulmonary conditions. He is a acceptable surgical candidate. This consult was sent back the referring physician, Dr. Dae Webster.      Assessment:  Diagnoses and all orders for this visit:     Preoperative

## 2018-07-26 NOTE — BRIEF OP NOTE
Pre-Operative Diagnosis: Primary osteoarthritis of left hip [M16.12]     Post-Operative Diagnosis: Primary osteoarthritis of left hip [M16.12]      Procedure Performed:   Procedure(s):  LEFT TOTAL HIP ARTHROPLASTY    Surgeon(s) and Role:     Debora Montgomery

## 2018-07-26 NOTE — ANESTHESIA PREPROCEDURE EVALUATION
PRE-OP EVALUATION    Patient Name: Elly Smith    Pre-op Diagnosis: Primary osteoarthritis of left hip [M16.12]    Procedure(s):  LEFT TOTAL HIP ARTHROPLASTY    Surgeon(s) and Role:     Tsering Markham MD - Primary    Pre-op vitals reviewed. COLONOSCOPY  5/1/2014: COLONOSCOPY      Comment: Procedure: COLONOSCOPY;  Surgeon: Socrates Walters MD;  Location: [address] [place] [address]  10/23/2017: COLONOSCOPY N/A      Comment: Procedure: COLONOSCOPY;  Surgeon: Socrates Waltesr MD; iliaca block, with the patient as outlined in the anesthesia consent and the peripheral nerve block procedure note. The paient understands, wishes to proceed and has no further questions. Spinal anesthesia discussed with possible need for GA backup.

## 2018-07-26 NOTE — INTERVAL H&P NOTE
Pre-op Diagnosis: Primary osteoarthritis of left hip [M16.12]    The above referenced H&P was reviewed by Halina Cheadle, MD on 7/26/2018, the patient was examined and no significant changes have occurred in the patient's condition since the H&P was perfo

## 2018-07-26 NOTE — PHYSICAL THERAPY NOTE
PHYSICAL THERAPY HIP EVALUATION - INPATIENT     Room Number: 376/376-A  Evaluation Date: 7/26/2018  Type of Evaluation: Initial  Physician Order: PT Eval and Treat    Presenting Problem: L RUTH ANN 7/26/18  Reason for Therapy: Mobility Dysfunction and Discharge L Lower Extremity: Weight Bearing as Tolerated    PAIN ASSESSMENT  Ratin  Location: L hip  Management Techniques: Activity promotion;Repositioning; Body mechanics    COGNITION  · Overall Cognitive Status:  WFL - within functional limits    RANGE OF stance time (step to gait pattern)  Stoop/Curb Assistance: Not tested       Skilled Therapy Provided: Pt presents semi-reclined in bed, agreeable to PT eval. Spouse present. Pt educated on RUTH ANN anterior hip precautions with MD specific no active ABD.  Pt ins limitations in independent bed mobility, transfers, gait and stair negotiation. The patient is below baseline and would benefit from skilled inpatient PT to address the above deficits to assist patient in returning to prior to level of function.   [id number] [address] Shields

## 2018-07-26 NOTE — ANESTHESIA POSTPROCEDURE EVALUATION
[address] [place], [place] [address] Patient Status:  Surgery Admit   Age/Gender 74year old male MRN [id number]   [facility] SURGERY Attending Maia Willoughby MD   Hosp Day # 0 PCP Mervin Britt MD       Anesthesia Post-op Note    Proced

## 2018-07-26 NOTE — OPERATIVE REPORT
PATIENT'S NAME: Sarthak Perea   ATTENDING PHYSICIAN: Yanira Miller MD   OPERATING PHYSICIAN: Yanira Miller MD   PATIENT ACCOUNT#:   [de-identified]    LOCATION:  [address]  MEDICAL RECORD #:   [id number]    YOB: 1945  [facility] trochanter and down in line with the vastus lateralis.   The tendon was subperiosteally elevated off the anterior aspect of the greater trochanter down to  capsule, which was incised in a T-type fashion, partially excising the capsule to gain access to the final head was impacted in place with good fixation. The final reduction was performed and the hip was put through good range of motion with good leg length and offset and no instability. The pin was removed from the pelvis.   Irrisept irrigation was plac

## 2018-07-27 PROBLEM — Z47.89 ORTHOPEDIC AFTERCARE: Status: ACTIVE | Noted: 2018-07-27

## 2018-07-27 LAB
ANION GAP SERPL CALC-SCNC: 9 MMOL/L (ref 0–18)
BUN BLD-MCNC: 25 MG/DL (ref 8–20)
BUN/CREAT SERPL: 18.7 (ref 10–20)
CALCIUM BLD-MCNC: 8.4 MG/DL (ref 8.3–10.3)
CHLORIDE SERPL-SCNC: 107 MMOL/L (ref 101–111)
CO2 SERPL-SCNC: 18 MMOL/L (ref 22–32)
CREAT BLD-MCNC: 1.34 MG/DL (ref 0.7–1.3)
ERYTHROCYTE [DISTWIDTH] IN BLOOD BY AUTOMATED COUNT: 13.7 % (ref 11.5–16)
GLUCOSE BLD-MCNC: 96 MG/DL (ref 70–99)
HCT VFR BLD AUTO: 35.8 % (ref 37–53)
HGB BLD-MCNC: 12.2 G/DL (ref 13–17)
MCH RBC QN AUTO: 32.1 PG (ref 27–33.2)
MCHC RBC AUTO-ENTMCNC: 34.1 G/DL (ref 31–37)
MCV RBC AUTO: 94.2 FL (ref 80–99)
OSMOLALITY SERPL CALC.SUM OF ELEC: 282 MOSM/KG (ref 275–295)
PLATELET # BLD AUTO: 244 10(3)UL (ref 150–450)
POTASSIUM SERPL-SCNC: 4.6 MMOL/L (ref 3.6–5.1)
RBC # BLD AUTO: 3.8 X10(6)UL (ref 3.8–5.8)
RED CELL DISTRIBUTION WIDTH-SD: 47 FL (ref 35.1–46.3)
SODIUM SERPL-SCNC: 134 MMOL/L (ref 136–144)
WBC # BLD AUTO: 12 X10(3) UL (ref 4–13)

## 2018-07-27 PROCEDURE — 97535 SELF CARE MNGMENT TRAINING: CPT

## 2018-07-27 PROCEDURE — 97165 OT EVAL LOW COMPLEX 30 MIN: CPT

## 2018-07-27 PROCEDURE — 97150 GROUP THERAPEUTIC PROCEDURES: CPT

## 2018-07-27 PROCEDURE — 97116 GAIT TRAINING THERAPY: CPT

## 2018-07-27 PROCEDURE — 85027 COMPLETE CBC AUTOMATED: CPT | Performed by: PHYSICIAN ASSISTANT

## 2018-07-27 PROCEDURE — 80048 BASIC METABOLIC PNL TOTAL CA: CPT | Performed by: ORTHOPAEDIC SURGERY

## 2018-07-27 RX ORDER — OXYCODONE HYDROCHLORIDE 5 MG/1
2.5 TABLET ORAL EVERY 4 HOURS PRN
Status: ACTIVE | OUTPATIENT
Start: 2018-07-27 | End: 2018-07-28

## 2018-07-27 RX ORDER — OXYCODONE HYDROCHLORIDE 10 MG/1
10 TABLET ORAL EVERY 4 HOURS PRN
Status: ACTIVE | OUTPATIENT
Start: 2018-07-27 | End: 2018-07-28

## 2018-07-27 RX ORDER — HYDROCODONE BITARTRATE AND ACETAMINOPHEN 10; 325 MG/1; MG/1
2 TABLET ORAL EVERY 4 HOURS PRN
Status: DISCONTINUED | OUTPATIENT
Start: 2018-07-28 | End: 2018-07-28

## 2018-07-27 RX ORDER — CALCIUM CARBONATE 200(500)MG
1000 TABLET,CHEWABLE ORAL
Status: DISCONTINUED | OUTPATIENT
Start: 2018-07-27 | End: 2018-07-28

## 2018-07-27 RX ORDER — HYDROCODONE BITARTRATE AND ACETAMINOPHEN 10; 325 MG/1; MG/1
1 TABLET ORAL EVERY 4 HOURS PRN
Status: DISCONTINUED | OUTPATIENT
Start: 2018-07-28 | End: 2018-07-28

## 2018-07-27 RX ORDER — OXYCODONE HYDROCHLORIDE 5 MG/1
5 TABLET ORAL EVERY 4 HOURS PRN
Status: DISPENSED | OUTPATIENT
Start: 2018-07-27 | End: 2018-07-28

## 2018-07-27 NOTE — CERTIFICATION
**Certification    PHYSICIAN Certification of Need for Inpatient Hospitalization    Based on the his current state of illness, Albino Hill requires inpatient hospitalization for his orthopedic surgery

## 2018-07-27 NOTE — PAYOR COMM NOTE
--------------  ADMISSION REVIEW       7/26      DIRECT FOR OR    OPERATIVE REPORT     PREOPERATIVE DIAGNOSIS:  Left hip primary osteoarthritis. POSTOPERATIVE DIAGNOSIS: Left hip primary osteoarthritis.   PROCEDURE PERFORMED:  Left total hip arthroplasty w

## 2018-07-27 NOTE — PROGRESS NOTES
[facility]  Orthopedic Surgery  Progress Note    Jose Rojas Patient Status:  Inpatient    1945 MRN [id number]   [facility] 3SW-A Attending Maurilio Cintron MD   [facility] Day # 1 PCP Renu Thomas MD     SUBJECTIVE:  INT 244.0      No results for input(s): PTP, INR in the last 168 hours. DIAGNOSTICS:       ASSESSMENT/PLAN:  1. Continue pain management  2. Continue DVT prophylaxis   3. Continue PT/OT  4. Discharge planning (home with health likely tomorrow)  5.  Continue

## 2018-07-27 NOTE — PROGRESS NOTES
[facility] Pharmacy Note:  Renal Dose Adjustment    Dewain Cowden Somerlot has been prescribed famotidine (PEPCID) 20 mg orally every 12 hours. Estimated Creatinine Clearance: 42.4 mL/min (A) (based on SCr of 1.5 mg/dL (H)).     His calculated creatinine clearance

## 2018-07-27 NOTE — OCCUPATIONAL THERAPY NOTE
OCCUPATIONAL THERAPY EVALUATION - INPATIENT     Room Number: 376/376-A  Evaluation Date: 7/27/2018  Type of Evaluation: Initial  Presenting Problem: s/p L RUTH ANN 7/26/18    Physician Order: IP Consult to Occupational Therapy  Reason for Therapy: ADL/IADL Dysf SUBJECTIVE   \"Using my cane really helped before surgery\"    Patient self-stated goal is to return to golfing.      OBJECTIVE  Precautions: RUTH ANN - anterior (No Active ABD)  Fall Risk: High fall risk    WEIGHT BEARING RESTRICTION  Weight Bearing Restric follow throughout session; education on LB dressing techniques/equipment, performed LB dressing donning underwear and shorts to knees SBA without AE, standing via RW and SBA, donning underwear and shorts to waist SBA with no loss of balance in standing; UB level of function.     Patient Complexity  Occupational Profile/Medical History LOW - Brief history including review of medical or therapy records    Specific performance deficits impacting engagement in ADL/IADL LOW  1 - 3 performance deficits    Client As

## 2018-07-27 NOTE — PROGRESS NOTES
Operative leg measured for tubigrip. Size E applied to left leg. Patient and wife instructed; verbalized understanding.

## 2018-07-27 NOTE — PHYSICAL THERAPY NOTE
PHYSICAL THERAPY HIP TREATMENT NOTE - INPATIENT      Room Number: 376/376-A     Session: 1 and 2  Number of Visits to Meet Established Goals: 3    Presenting Problem: L RUTH ANN 7/26/18    Problem List  Active Problems:    * No active hospital problems.  * standing up from a chair with arms (e.g., wheelchair, bedside commode, etc.): A Little   -   Moving from lying on back to sitting on the side of the bed?: A Little   How much help from another person does the patient currently need. ..   -   Moving to and f Curls 10 reps 15 reps   Forward, back steps 10 reps 15 reps   Short Squats 10 reps 15 reps     Comments:  Pt participated in group session, tolerance was good.    was present - yes   is a  - spouse    Patient End of Session: Up in chair;Needs met; #5     Patient verbalizes and/or demonstrates all precautions and safety concerns independently   Goal #6           Goal Comments: Goals established on 7/26/2018 - all goals ongoing as of 7/27/2018

## 2018-07-27 NOTE — CONSULTS
[facility] hospitalist initial consult note  PCP; Ivy Singh MD  Consulted at the request of Dr. Andreea Atkins  Reason for consult medical co-management    HPI 75 yo male with PMH including but not limited to  HTN, HL, OA here s/p Left total hip arthroplasty with Last attempt to quit: 1/1/1985    Smokeless tobacco: Former User    Alcohol use Yes  4.2 oz/week    7 Standard drinks or equivalent per week         Drug use: No    Sexual activity: Yes    Partners: Female     Other Topics Concern   None on file     Soc result in EPIC  =====  CONCLUSION:  Expected postoperative changes of left hip arthroplasty.           Assessment/plan      OA s/p Left total hip arthroplasty  Check CBC    HTN medication ordered  Monitor BMP and BP  Hold diuretic for now    Elevated creati

## 2018-07-27 NOTE — PROGRESS NOTES
[facility] hospitalist daily note  Patient was seen/examined on 7/27/18    S no chest pain, no SOB, no abd pain. Had indigestion today, tums ordered.  + flatus    medications in EPIC    PE 98.6 147/63  Gen: awake, alert, no respiratory distress  HEENT; mmm, anicte

## 2018-07-27 NOTE — CM/SW NOTE
07/27/18 1400   CM/SW Referral Data   Referral Source Physician   Reason for Referral Discharge planning   Informant Patient;Spouse   Pertinent Medical Hx   Primary Care Physician Name Devonda Lights   Patient Info   Patient's Mental Status Alert;Oriented

## 2018-07-28 VITALS
HEIGHT: 71 IN | HEART RATE: 60 BPM | TEMPERATURE: 99 F | OXYGEN SATURATION: 92 % | RESPIRATION RATE: 18 BRPM | SYSTOLIC BLOOD PRESSURE: 132 MMHG | WEIGHT: 155.31 LBS | BODY MASS INDEX: 21.74 KG/M2 | DIASTOLIC BLOOD PRESSURE: 59 MMHG

## 2018-07-28 LAB
ANION GAP SERPL CALC-SCNC: 7 MMOL/L (ref 0–18)
BASOPHILS # BLD AUTO: 0.03 X10(3) UL (ref 0–0.1)
BASOPHILS NFR BLD AUTO: 0.3 %
BUN BLD-MCNC: 19 MG/DL (ref 8–20)
BUN/CREAT SERPL: 15.4 (ref 10–20)
CALCIUM BLD-MCNC: 8.8 MG/DL (ref 8.3–10.3)
CHLORIDE SERPL-SCNC: 105 MMOL/L (ref 101–111)
CO2 SERPL-SCNC: 29 MMOL/L (ref 22–32)
CREAT BLD-MCNC: 1.23 MG/DL (ref 0.7–1.3)
EOSINOPHIL # BLD AUTO: 0.06 X10(3) UL (ref 0–0.3)
EOSINOPHIL NFR BLD AUTO: 0.5 %
ERYTHROCYTE [DISTWIDTH] IN BLOOD BY AUTOMATED COUNT: 13.9 % (ref 11.5–16)
ERYTHROCYTE [DISTWIDTH] IN BLOOD BY AUTOMATED COUNT: 13.9 % (ref 11.5–16)
GLUCOSE BLD-MCNC: 112 MG/DL (ref 70–99)
HCT VFR BLD AUTO: 34.8 % (ref 37–53)
HCT VFR BLD AUTO: 34.8 % (ref 37–53)
HGB BLD-MCNC: 11.8 G/DL (ref 13–17)
HGB BLD-MCNC: 11.8 G/DL (ref 13–17)
IMMATURE GRANULOCYTE COUNT: 0.05 X10(3) UL (ref 0–1)
IMMATURE GRANULOCYTE RATIO %: 0.4 %
LYMPHOCYTES # BLD AUTO: 1.78 X10(3) UL (ref 0.9–4)
LYMPHOCYTES NFR BLD AUTO: 15.1 %
MCH RBC QN AUTO: 32.1 PG (ref 27–33.2)
MCH RBC QN AUTO: 32.1 PG (ref 27–33.2)
MCHC RBC AUTO-ENTMCNC: 33.9 G/DL (ref 31–37)
MCHC RBC AUTO-ENTMCNC: 33.9 G/DL (ref 31–37)
MCV RBC AUTO: 94.6 FL (ref 80–99)
MCV RBC AUTO: 94.6 FL (ref 80–99)
MONOCYTES # BLD AUTO: 1.22 X10(3) UL (ref 0.1–1)
MONOCYTES NFR BLD AUTO: 10.4 %
NEUTROPHIL ABS PRELIM: 8.63 X10 (3) UL (ref 1.3–6.7)
NEUTROPHILS # BLD AUTO: 8.63 X10(3) UL (ref 1.3–6.7)
NEUTROPHILS NFR BLD AUTO: 73.3 %
OSMOLALITY SERPL CALC.SUM OF ELEC: 295 MOSM/KG (ref 275–295)
PLATELET # BLD AUTO: 231 10(3)UL (ref 150–450)
PLATELET # BLD AUTO: 231 10(3)UL (ref 150–450)
POTASSIUM SERPL-SCNC: 3.6 MMOL/L (ref 3.6–5.1)
RBC # BLD AUTO: 3.68 X10(6)UL (ref 3.8–5.8)
RBC # BLD AUTO: 3.68 X10(6)UL (ref 3.8–5.8)
RED CELL DISTRIBUTION WIDTH-SD: 48.2 FL (ref 35.1–46.3)
RED CELL DISTRIBUTION WIDTH-SD: 48.2 FL (ref 35.1–46.3)
SODIUM SERPL-SCNC: 141 MMOL/L (ref 136–144)
WBC # BLD AUTO: 11.8 X10(3) UL (ref 4–13)
WBC # BLD AUTO: 11.8 X10(3) UL (ref 4–13)

## 2018-07-28 PROCEDURE — 97150 GROUP THERAPEUTIC PROCEDURES: CPT

## 2018-07-28 PROCEDURE — 97535 SELF CARE MNGMENT TRAINING: CPT

## 2018-07-28 PROCEDURE — 80048 BASIC METABOLIC PNL TOTAL CA: CPT | Performed by: HOSPITALIST

## 2018-07-28 PROCEDURE — 97530 THERAPEUTIC ACTIVITIES: CPT

## 2018-07-28 PROCEDURE — 85025 COMPLETE CBC W/AUTO DIFF WBC: CPT | Performed by: HOSPITALIST

## 2018-07-28 PROCEDURE — 85027 COMPLETE CBC AUTOMATED: CPT | Performed by: PHYSICIAN ASSISTANT

## 2018-07-28 RX ORDER — POTASSIUM CHLORIDE 20 MEQ/1
40 TABLET, EXTENDED RELEASE ORAL EVERY 4 HOURS
Status: DISCONTINUED | OUTPATIENT
Start: 2018-07-28 | End: 2018-07-28

## 2018-07-28 NOTE — PROGRESS NOTES
[facility] hospitalist daily note    Seen/examined    S; no chest pain, no SOB, no abd pain, no nausea/emesis, + Bowel movement    Medications in EPIC    PE    07/28/18  0751   BP:    Pulse:    Resp: 18   Temp: 98.6 °F (37 °C)     Gen: awake, alert, no respirator

## 2018-07-28 NOTE — HOME CARE LIAISON
MET WITH PTNT AND OFFERED CHOICE  OF AGENCIES. PTNT AGREEABLE TO [facility]. MET WITH PTNT TO DISCUSS HOME HEALTH SERVICES AND COVERAGE CRITERIA. PTNT AGREEABLE TO [facility]. PTNT GIVEN RESIDENTIAL BROCHURE.  RESIDENTIAL WITH PROVIDE SN/PT ON DISC

## 2018-07-28 NOTE — CERTIFICATION
**Certification    PHYSICIAN Certification of Need for Inpatient Hospitalization    Based on the his current state of illness, Leanne Sunil requires inpatient hospitalization for his orthopedic surgery

## 2018-07-28 NOTE — CM/SW NOTE
07/28/18 1200   Discharge disposition   Expected discharge disposition Home-Health   Name of Facillity/Home Care/Hospice Residential     DC anticipated for today. [facility] aware.   Maddie Diaz, 07/28/18, 12:37 PM

## 2018-07-28 NOTE — PROGRESS NOTES
Acute Pain Service    Post Op Day 2 Ortho Note    Assessed patient in chair. Patient rates pain 2-3/10 at rest and 4/10 with activity. Patient states Juan Miguel Ortez is working well to manage pain; denies itching/nausea/dizziness.     Patient able to bear weight on s

## 2018-07-28 NOTE — OCCUPATIONAL THERAPY NOTE
OCCUPATIONAL THERAPY TREATMENT NOTE - INPATIENT     Room Number: 376/376-A  Session: 1   Number of Visits to Meet Established Goals: 0    Presenting Problem: s/p L RUTH ANN 7/26/18    History related to current admission:  Patient is 74year old male admitted o another person does the patient currently need…  -   Putting on and taking off regular lower body clothing?: None  -   Bathing (including washing, rinsing, drying)?: None  -   Toileting, which includes using toilet, bedpan or urinal? : None  -   Putting on Potential : Good    OT Goals:     ADL GOALS   Patient will perform Lower Body Dressing with supervision (just socks/shoes left to assess) MET  Patient will perform Toileting with supervision MET     FUNCTIONAL TRANSFER GOALS   Patient will transfer to Toil

## 2018-07-28 NOTE — PLAN OF CARE
PAIN - ADULT    • Verbalizes/displays adequate comfort level or patient's stated pain goal Progressing        Patient/Family Goals    • Patient/Family Short Term Goal Progressing        SAFETY ADULT - FALL    • Free from fall injury Progressing        Sitt

## 2018-07-28 NOTE — PROGRESS NOTES
[address] Patient Status:  Inpatient    1945 MRN [id number]   [facility] 3SW-A Attending Ciera Ambriz MD   Hosp Day # 2 PCP Lizbeth Klein MD     Subjective:  LeftTotal Hip Arthroplasty  Systemic or Spe

## 2018-07-28 NOTE — PHYSICAL THERAPY NOTE
PHYSICAL THERAPY HIP TREATMENT NOTE - INPATIENT      Room Number: 376/376-A     Session: 3/3  Number of Visits to Meet Established Goals: 3    Presenting Problem: s/p L THR    Problem List  Active Problems:    * No active hospital problems.  *      Past Med up from a chair with arms (e.g., wheelchair, bedside commode, etc.): None   -   Moving from lying on back to sitting on the side of the bed?: None   How much help from another person does the patient currently need. ..   -   Moving to and from a bed to a ch training  Rehab Potential : Good  Frequency (Obs): Daily    CURRENT GOALS     Goal #1  Patient is able to demonstrate supine - sit EOB @ level: supervision      Goal #2  Patient is able to demonstrate transfers Sit to/from Stand at assistance level: superv

## 2018-07-30 NOTE — DISCHARGE SUMMARY
Patient ID:  Jose Rojas  [id number]  74year old  6/6/1945    Admit Date: 7/26/2018    Discharge Date and Time: 7/28/2018     Attending Physician: Maurilio Cintron MD    Reason for admission: Primary osteoarthritis of left hip [M16.12]    Discharge (two) times daily. , Normal, Disp-52 tablet, R-0    HYDROcodone-acetaminophen (NORCO)  MG Oral Tab  1-2 tabs every 4 hours, prn, Normal, Disp-50 tablet, R-0        Follow-up with Robbi Sacks, MD in 2 weeks.      Daniel Camejo MD  7/30/2018  1:0

## 2018-08-10 PROBLEM — Z96.642 STATUS POST TOTAL REPLACEMENT OF LEFT HIP: Status: ACTIVE | Noted: 2018-08-10

## 2018-08-14 PROBLEM — M25.552 PAIN OF LEFT HIP JOINT: Status: ACTIVE | Noted: 2018-08-14

## 2019-01-08 PROCEDURE — 84153 ASSAY OF PSA TOTAL: CPT | Performed by: INTERNAL MEDICINE

## 2019-01-08 PROCEDURE — 84154 ASSAY OF PSA FREE: CPT | Performed by: INTERNAL MEDICINE

## 2019-01-08 PROCEDURE — 82570 ASSAY OF URINE CREATININE: CPT | Performed by: INTERNAL MEDICINE

## 2019-01-08 PROCEDURE — 82043 UR ALBUMIN QUANTITATIVE: CPT | Performed by: INTERNAL MEDICINE

## 2019-01-08 PROCEDURE — 81003 URINALYSIS AUTO W/O SCOPE: CPT | Performed by: INTERNAL MEDICINE

## 2019-01-09 PROBLEM — R97.20 ELEVATED PSA: Status: ACTIVE | Noted: 2019-01-09

## 2019-01-09 PROBLEM — I70.0 ATHEROSCLEROSIS OF AORTA (HCC): Status: ACTIVE | Noted: 2019-01-09

## 2019-05-13 PROBLEM — I77.1 TORTUOUS AORTA (HCC): Status: ACTIVE | Noted: 2019-05-13

## 2019-05-17 PROBLEM — S46.012A ROTATOR CUFF STRAIN, LEFT, INITIAL ENCOUNTER: Status: ACTIVE | Noted: 2019-05-17

## 2019-06-18 PROBLEM — M75.122 NONTRAUMATIC COMPLETE TEAR OF LEFT ROTATOR CUFF: Status: ACTIVE | Noted: 2019-06-18

## 2019-10-11 ENCOUNTER — HOSPITAL ENCOUNTER (OUTPATIENT)
Age: 74
Discharge: HOME | End: 2019-10-11
Payer: MEDICARE

## 2019-10-11 VITALS
SYSTOLIC BLOOD PRESSURE: 150 MMHG | OXYGEN SATURATION: 100 % | RESPIRATION RATE: 20 BRPM | HEART RATE: 52 BPM | DIASTOLIC BLOOD PRESSURE: 76 MMHG | TEMPERATURE: 98 F

## 2019-10-11 DIAGNOSIS — H10.32 ACUTE BACTERIAL CONJUNCTIVITIS OF LEFT EYE: Primary | ICD-10-CM

## 2019-10-11 PROCEDURE — 99204 OFFICE O/P NEW MOD 45 MIN: CPT

## 2019-10-11 PROCEDURE — 99213 OFFICE O/P EST LOW 20 MIN: CPT

## 2019-10-11 RX ORDER — SULFACETAMIDE SODIUM 100 MG/ML
2 SOLUTION/ DROPS OPHTHALMIC EVERY 4 HOURS
Qty: 1 BOTTLE | Refills: 0 | Status: SHIPPED | OUTPATIENT
Start: 2019-10-11 | End: 2019-10-21

## 2019-10-11 NOTE — ED PROVIDER NOTES
Patient Seen in: [address] [facility]      History   Patient presents with:  Eye Pain    Stated Complaint: eye redness    . 12-year-old male who presents to the emergency room with complaints of eye redness, tearing for the past couple days. status: Light Tobacco Smoker        Packs/day: 1.00        Years: 20.00        Pack years: 21        Quit date: 1985        Years since quittin.7      Smokeless tobacco: Former User    Alcohol use:  Yes      Alcohol/week: 7.0 standard drinks      T Pulmonary/Chest: Effort normal.   Neurological: He is alert and oriented to person, place, and time. Skin: Skin is warm and dry. Capillary refill takes less than 2 seconds. He is not diaphoretic. Psychiatric: He has a normal mood and affect.  His beha

## 2019-10-14 PROBLEM — I73.9 PVD (PERIPHERAL VASCULAR DISEASE) (HCC): Status: ACTIVE | Noted: 2019-10-14

## 2019-10-14 PROBLEM — N18.2 CKD (CHRONIC KIDNEY DISEASE) STAGE 2, GFR 60-89 ML/MIN: Status: RESOLVED | Noted: 2017-12-12 | Resolved: 2019-10-14
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
## 2019-10-14 PROBLEM — A04.72 C. DIFFICILE COLITIS: Status: RESOLVED | Noted: 2017-11-20 | Resolved: 2019-10-14

## 2019-10-14 PROBLEM — R19.7 DIARRHEA: Status: RESOLVED | Noted: 2017-08-28 | Resolved: 2019-10-14

## 2019-10-14 PROBLEM — S46.012A ROTATOR CUFF STRAIN, LEFT, INITIAL ENCOUNTER: Status: RESOLVED | Noted: 2019-05-17 | Resolved: 2019-10-14

## 2019-10-14 PROBLEM — Z47.89 ORTHOPEDIC AFTERCARE: Status: RESOLVED | Noted: 2018-07-27 | Resolved: 2019-10-14

## 2019-10-14 PROBLEM — M75.122 NONTRAUMATIC COMPLETE TEAR OF LEFT ROTATOR CUFF: Status: RESOLVED | Noted: 2019-06-18 | Resolved: 2019-10-14

## 2019-10-14 PROBLEM — M16.12 PRIMARY OSTEOARTHRITIS OF LEFT HIP: Status: RESOLVED | Noted: 2018-07-16 | Resolved: 2019-10-14

## 2019-10-14 PROBLEM — M25.552 PAIN OF LEFT HIP JOINT: Status: RESOLVED | Noted: 2018-08-14 | Resolved: 2019-10-14

## 2021-02-12 PROBLEM — D63.1 ANEMIA DUE TO STAGE 3 CHRONIC KIDNEY DISEASE (HCC): Status: ACTIVE | Noted: 2021-02-12

## 2021-02-12 PROBLEM — D63.1 ANEMIA DUE TO STAGE 3A CHRONIC KIDNEY DISEASE (HCC): Status: ACTIVE | Noted: 2021-02-12

## 2021-02-12 PROBLEM — N18.31 ANEMIA DUE TO STAGE 3A CHRONIC KIDNEY DISEASE (HCC): Status: ACTIVE | Noted: 2021-02-12

## 2021-02-12 PROBLEM — N18.30 ANEMIA DUE TO STAGE 3 CHRONIC KIDNEY DISEASE (HCC): Status: ACTIVE | Noted: 2021-02-12

## 2021-05-19 PROBLEM — M12.812 ROTATOR CUFF ARTHROPATHY OF LEFT SHOULDER: Status: ACTIVE | Noted: 2021-05-19

## 2021-08-27 PROBLEM — I12.9 HYPERTENSIVE KIDNEY DISEASE WITH CHRONIC KIDNEY DISEASE: Status: ACTIVE | Noted: 2021-08-27

## 2021-10-05 PROBLEM — M19.012 OSTEOARTHRITIS OF LEFT ACROMIOCLAVICULAR JOINT: Status: ACTIVE | Noted: 2021-10-05

## 2022-02-28 PROBLEM — Z79.4 TYPE 2 DIABETES MELLITUS WITH CHRONIC KIDNEY DISEASE, WITH LONG-TERM CURRENT USE OF INSULIN, UNSPECIFIED CKD STAGE (HCC): Status: ACTIVE | Noted: 2022-02-28

## 2022-02-28 PROBLEM — E11.22 TYPE 2 DIABETES MELLITUS WITH CHRONIC KIDNEY DISEASE, WITH LONG-TERM CURRENT USE OF INSULIN, UNSPECIFIED CKD STAGE (HCC): Status: ACTIVE | Noted: 2022-02-28

## 2022-03-01 PROBLEM — E11.22 TYPE 2 DIABETES MELLITUS WITH CHRONIC KIDNEY DISEASE, WITH LONG-TERM CURRENT USE OF INSULIN, UNSPECIFIED CKD STAGE (HCC): Status: RESOLVED | Noted: 2022-02-28 | Resolved: 2022-03-01

## 2022-03-01 PROBLEM — Z79.4 TYPE 2 DIABETES MELLITUS WITH CHRONIC KIDNEY DISEASE, WITH LONG-TERM CURRENT USE OF INSULIN, UNSPECIFIED CKD STAGE (HCC): Status: RESOLVED | Noted: 2022-02-28 | Resolved: 2022-03-01

## 2022-03-01 PROBLEM — R55 SYNCOPAL EPISODES: Status: RESOLVED | Noted: 2018-04-01 | Resolved: 2022-03-01

## 2022-03-01 PROBLEM — M12.812 ROTATOR CUFF ARTHROPATHY OF LEFT SHOULDER: Status: RESOLVED | Noted: 2021-05-19 | Resolved: 2022-03-01

## 2022-04-02 ENCOUNTER — HOSPITAL ENCOUNTER (OUTPATIENT)
Age: 77
Discharge: HOME | End: 2022-04-02
Payer: MEDICARE

## 2022-04-02 VITALS
DIASTOLIC BLOOD PRESSURE: 76 MMHG | TEMPERATURE: 97 F | HEART RATE: 57 BPM | WEIGHT: 185 LBS | OXYGEN SATURATION: 97 % | SYSTOLIC BLOOD PRESSURE: 155 MMHG | HEIGHT: 71 IN | RESPIRATION RATE: 16 BRPM | BODY MASS INDEX: 25.9 KG/M2

## 2022-04-02 DIAGNOSIS — H11.31 SUBCONJUNCTIVAL HEMORRHAGE OF RIGHT EYE: Primary | ICD-10-CM

## 2022-04-02 PROCEDURE — 99203 OFFICE O/P NEW LOW 30 MIN: CPT | Performed by: NURSE PRACTITIONER

## 2022-04-02 RX ORDER — TETRACAINE HYDROCHLORIDE 5 MG/ML
1 SOLUTION OPHTHALMIC ONCE
Status: COMPLETED | OUTPATIENT
Start: 2022-04-02 | End: 2022-04-02

## 2022-04-02 NOTE — ED INITIAL ASSESSMENT (HPI)
Pt here w/ right eye w/ redness to conjunctiva area. +itching. No pain. States left eye beginning to itch also.

## 2023-08-07 NOTE — PRE-SEDATION ASSESSMENT
Physician Pre-Sedation Assessment    Pre-Sedation Assessment:    Sedation History: Airway Assessed    Cardiac: normal S1, S2  Respiratory: breath sounds clear bilaterally   Abdomen: soft, BS (+), non-tender    ASA Classification: 2.  Patient with mild syste Received call from Nurse at [facility] requesting a verbal order to removal staples for the patient because her post operative visit with Dr. Sutton is not until 8/28; Surgery was on 07/19/2023. Verbal order was given for staple removal.

## 2024-08-27 RX ORDER — ALFUZOSIN HYDROCHLORIDE 10 MG/1
10 TABLET, EXTENDED RELEASE ORAL DAILY
COMMUNITY

## 2024-09-05 ENCOUNTER — LABORATORY ENCOUNTER (OUTPATIENT)
Dept: LAB | Age: 79
End: 2024-09-05
Attending: ORTHOPAEDIC SURGERY
Payer: MEDICARE

## 2024-09-05 DIAGNOSIS — M16.11 OSTEOARTHRITIS OF RIGHT HIP: ICD-10-CM

## 2024-09-05 LAB
ANTIBODY SCREEN: NEGATIVE
RH BLOOD TYPE: NEGATIVE

## 2024-09-05 PROCEDURE — 86900 BLOOD TYPING SEROLOGIC ABO: CPT

## 2024-09-05 PROCEDURE — 86901 BLOOD TYPING SEROLOGIC RH(D): CPT

## 2024-09-05 PROCEDURE — 86850 RBC ANTIBODY SCREEN: CPT

## 2024-09-13 ENCOUNTER — ORDER TRANSCRIPTION (OUTPATIENT)
Dept: PHYSICAL THERAPY | Facility: HOSPITAL | Age: 79
End: 2024-09-13

## 2024-09-13 DIAGNOSIS — Z96.641 STATUS POST TOTAL REPLACEMENT OF RIGHT HIP: Primary | ICD-10-CM

## 2024-09-19 ENCOUNTER — APPOINTMENT (OUTPATIENT)
Dept: GENERAL RADIOLOGY | Facility: HOSPITAL | Age: 79
End: 2024-09-19
Attending: ORTHOPAEDIC SURGERY
Payer: MEDICARE

## 2024-09-19 ENCOUNTER — ANESTHESIA EVENT (OUTPATIENT)
Dept: SURGERY | Facility: HOSPITAL | Age: 79
End: 2024-09-19
Payer: MEDICARE

## 2024-09-19 ENCOUNTER — HOSPITAL ENCOUNTER (OUTPATIENT)
Facility: HOSPITAL | Age: 79
Discharge: HOME HEALTH | End: 2024-09-20
Attending: ORTHOPAEDIC SURGERY | Admitting: ORTHOPAEDIC SURGERY
Payer: MEDICARE

## 2024-09-19 ENCOUNTER — APPOINTMENT (OUTPATIENT)
Dept: GENERAL RADIOLOGY | Facility: HOSPITAL | Age: 79
End: 2024-09-19
Attending: NURSE PRACTITIONER
Payer: MEDICARE

## 2024-09-19 ENCOUNTER — ANESTHESIA (OUTPATIENT)
Dept: SURGERY | Facility: HOSPITAL | Age: 79
End: 2024-09-19
Payer: MEDICARE

## 2024-09-19 DIAGNOSIS — M16.11 OSTEOARTHRITIS OF RIGHT HIP: Primary | ICD-10-CM

## 2024-09-19 PROCEDURE — 76000 FLUOROSCOPY <1 HR PHYS/QHP: CPT | Performed by: ORTHOPAEDIC SURGERY

## 2024-09-19 PROCEDURE — 97116 GAIT TRAINING THERAPY: CPT

## 2024-09-19 PROCEDURE — 0SR904Z REPLACEMENT OF RIGHT HIP JOINT WITH CERAMIC ON POLYETHYLENE SYNTHETIC SUBSTITUTE, OPEN APPROACH: ICD-10-PCS | Performed by: ORTHOPAEDIC SURGERY

## 2024-09-19 PROCEDURE — 73501 X-RAY EXAM HIP UNI 1 VIEW: CPT | Performed by: NURSE PRACTITIONER

## 2024-09-19 PROCEDURE — 97161 PT EVAL LOW COMPLEX 20 MIN: CPT

## 2024-09-19 PROCEDURE — 88304 TISSUE EXAM BY PATHOLOGIST: CPT | Performed by: ORTHOPAEDIC SURGERY

## 2024-09-19 PROCEDURE — 88311 DECALCIFY TISSUE: CPT | Performed by: ORTHOPAEDIC SURGERY

## 2024-09-19 DEVICE — BIOLOX DELTA CERAMIC FEMORAL HEAD +1.5 36MM DIA 12/14 TAPER
Type: IMPLANTABLE DEVICE | Site: HIP | Status: FUNCTIONAL
Brand: BIOLOX DELTA

## 2024-09-19 DEVICE — PINNACLE HIP SOLUTIONS ALTRX POLYETHYLENE ACETABULAR LINER +4 NEUTRAL 36MM ID 56MM OD
Type: IMPLANTABLE DEVICE | Site: HIP | Status: FUNCTIONAL
Brand: PINNACLE ALTRX

## 2024-09-19 DEVICE — APEX HOLE ELIMINATOR - PS
Type: IMPLANTABLE DEVICE | Site: HIP | Status: FUNCTIONAL
Brand: APEX

## 2024-09-19 DEVICE — PINNACLE 100 ACETABULAR SHELL GRIPTION 100 56MM OD
Type: IMPLANTABLE DEVICE | Site: HIP | Status: FUNCTIONAL
Brand: PINNACLE GRIPTION

## 2024-09-19 DEVICE — ACTIS DUOFIX HIP PROSTHESIS (FEMORAL STEM 12/14 TAPER CEMENTLESS SIZE 7 HIGH COLLAR)  CE
Type: IMPLANTABLE DEVICE | Site: HIP | Status: FUNCTIONAL
Brand: ACTIS

## 2024-09-19 RX ORDER — HYDROMORPHONE HYDROCHLORIDE 1 MG/ML
0.6 INJECTION, SOLUTION INTRAMUSCULAR; INTRAVENOUS; SUBCUTANEOUS EVERY 5 MIN PRN
Status: DISCONTINUED | OUTPATIENT
Start: 2024-09-19 | End: 2024-09-19 | Stop reason: HOSPADM

## 2024-09-19 RX ORDER — ACETAMINOPHEN AND CODEINE PHOSPHATE 300; 30 MG/1; MG/1
2 TABLET ORAL EVERY 4 HOURS PRN
Status: DISCONTINUED | OUTPATIENT
Start: 2024-09-19 | End: 2024-09-20

## 2024-09-19 RX ORDER — SODIUM CHLORIDE 9 MG/ML
INJECTION, SOLUTION INTRAVENOUS CONTINUOUS
Status: DISCONTINUED | OUTPATIENT
Start: 2024-09-19 | End: 2024-09-20

## 2024-09-19 RX ORDER — TRANEXAMIC ACID 10 MG/ML
1000 INJECTION, SOLUTION INTRAVENOUS ONCE
Status: COMPLETED | OUTPATIENT
Start: 2024-09-19 | End: 2024-09-19

## 2024-09-19 RX ORDER — FAMOTIDINE 20 MG/1
20 TABLET, FILM COATED ORAL 2 TIMES DAILY
COMMUNITY

## 2024-09-19 RX ORDER — OXYCODONE HYDROCHLORIDE 5 MG/1
5 TABLET ORAL EVERY 4 HOURS PRN
Status: DISCONTINUED | OUTPATIENT
Start: 2024-09-19 | End: 2024-09-20

## 2024-09-19 RX ORDER — ASPIRIN 325 MG
325 TABLET ORAL 2 TIMES DAILY
COMMUNITY
Start: 2024-09-10

## 2024-09-19 RX ORDER — ONDANSETRON 2 MG/ML
4 INJECTION INTRAMUSCULAR; INTRAVENOUS EVERY 6 HOURS PRN
Status: DISCONTINUED | OUTPATIENT
Start: 2024-09-19 | End: 2024-09-19 | Stop reason: HOSPADM

## 2024-09-19 RX ORDER — POLYETHYLENE GLYCOL 3350 17 G/17G
17 POWDER, FOR SOLUTION ORAL DAILY PRN
Status: DISCONTINUED | OUTPATIENT
Start: 2024-09-19 | End: 2024-09-20

## 2024-09-19 RX ORDER — DEXAMETHASONE SODIUM PHOSPHATE 10 MG/ML
8 INJECTION, SOLUTION INTRAMUSCULAR; INTRAVENOUS ONCE
Status: COMPLETED | OUTPATIENT
Start: 2024-09-20 | End: 2024-09-20

## 2024-09-19 RX ORDER — TRAMADOL HYDROCHLORIDE 50 MG/1
50 TABLET ORAL EVERY 12 HOURS SCHEDULED
Status: DISCONTINUED | OUTPATIENT
Start: 2024-09-19 | End: 2024-09-20

## 2024-09-19 RX ORDER — ACETAMINOPHEN AND CODEINE PHOSPHATE 300; 30 MG/1; MG/1
1 TABLET ORAL EVERY 4 HOURS PRN
Status: DISCONTINUED | OUTPATIENT
Start: 2024-09-19 | End: 2024-09-20

## 2024-09-19 RX ORDER — SODIUM CHLORIDE, SODIUM LACTATE, POTASSIUM CHLORIDE, CALCIUM CHLORIDE 600; 310; 30; 20 MG/100ML; MG/100ML; MG/100ML; MG/100ML
INJECTION, SOLUTION INTRAVENOUS CONTINUOUS
Status: DISCONTINUED | OUTPATIENT
Start: 2024-09-19 | End: 2024-09-19 | Stop reason: HOSPADM

## 2024-09-19 RX ORDER — HYDROMORPHONE HYDROCHLORIDE 1 MG/ML
0.4 INJECTION, SOLUTION INTRAMUSCULAR; INTRAVENOUS; SUBCUTANEOUS EVERY 5 MIN PRN
Status: DISCONTINUED | OUTPATIENT
Start: 2024-09-19 | End: 2024-09-19 | Stop reason: HOSPADM

## 2024-09-19 RX ORDER — ACETAMINOPHEN 325 MG/1
650 TABLET ORAL
Status: DISCONTINUED | OUTPATIENT
Start: 2024-09-19 | End: 2024-09-20

## 2024-09-19 RX ORDER — DOCUSATE SODIUM 100 MG/1
100 CAPSULE, LIQUID FILLED ORAL 2 TIMES DAILY
Status: DISCONTINUED | OUTPATIENT
Start: 2024-09-19 | End: 2024-09-20

## 2024-09-19 RX ORDER — OXYCODONE HYDROCHLORIDE 10 MG/1
10 TABLET ORAL EVERY 4 HOURS PRN
Status: DISCONTINUED | OUTPATIENT
Start: 2024-09-19 | End: 2024-09-20

## 2024-09-19 RX ORDER — BISACODYL 10 MG
10 SUPPOSITORY, RECTAL RECTAL
Status: DISCONTINUED | OUTPATIENT
Start: 2024-09-19 | End: 2024-09-20

## 2024-09-19 RX ORDER — PHENYLEPHRINE HCL 10 MG/ML
VIAL (ML) INJECTION AS NEEDED
Status: DISCONTINUED | OUTPATIENT
Start: 2024-09-19 | End: 2024-09-19 | Stop reason: SURG

## 2024-09-19 RX ORDER — MEPERIDINE HYDROCHLORIDE 25 MG/ML
12.5 INJECTION INTRAMUSCULAR; INTRAVENOUS; SUBCUTANEOUS AS NEEDED
Status: DISCONTINUED | OUTPATIENT
Start: 2024-09-19 | End: 2024-09-19 | Stop reason: HOSPADM

## 2024-09-19 RX ORDER — DIPHENHYDRAMINE HYDROCHLORIDE 50 MG/ML
12.5 INJECTION INTRAMUSCULAR; INTRAVENOUS EVERY 4 HOURS PRN
Status: DISCONTINUED | OUTPATIENT
Start: 2024-09-19 | End: 2024-09-20

## 2024-09-19 RX ORDER — HYDROMORPHONE HYDROCHLORIDE 1 MG/ML
0.2 INJECTION, SOLUTION INTRAMUSCULAR; INTRAVENOUS; SUBCUTANEOUS EVERY 2 HOUR PRN
Status: DISCONTINUED | OUTPATIENT
Start: 2024-09-19 | End: 2024-09-20

## 2024-09-19 RX ORDER — SENNOSIDES 8.6 MG
17.2 TABLET ORAL NIGHTLY
Status: DISCONTINUED | OUTPATIENT
Start: 2024-09-19 | End: 2024-09-20

## 2024-09-19 RX ORDER — ACETAMINOPHEN 325 MG/1
650 TABLET ORAL ONCE
Status: COMPLETED | OUTPATIENT
Start: 2024-09-19 | End: 2024-09-19

## 2024-09-19 RX ORDER — SODIUM CHLORIDE, SODIUM LACTATE, POTASSIUM CHLORIDE, CALCIUM CHLORIDE 600; 310; 30; 20 MG/100ML; MG/100ML; MG/100ML; MG/100ML
INJECTION, SOLUTION INTRAVENOUS CONTINUOUS
Status: DISCONTINUED | OUTPATIENT
Start: 2024-09-19 | End: 2024-09-20

## 2024-09-19 RX ORDER — HYDROMORPHONE HYDROCHLORIDE 1 MG/ML
0.4 INJECTION, SOLUTION INTRAMUSCULAR; INTRAVENOUS; SUBCUTANEOUS EVERY 2 HOUR PRN
Status: DISCONTINUED | OUTPATIENT
Start: 2024-09-19 | End: 2024-09-20

## 2024-09-19 RX ORDER — NALOXONE HYDROCHLORIDE 0.4 MG/ML
0.08 INJECTION, SOLUTION INTRAMUSCULAR; INTRAVENOUS; SUBCUTANEOUS AS NEEDED
Status: DISCONTINUED | OUTPATIENT
Start: 2024-09-19 | End: 2024-09-19 | Stop reason: HOSPADM

## 2024-09-19 RX ORDER — CELECOXIB 200 MG/1
200 CAPSULE ORAL DAILY
COMMUNITY
Start: 2024-09-10 | End: 2024-11-09

## 2024-09-19 RX ORDER — HYDROMORPHONE HYDROCHLORIDE 1 MG/ML
0.2 INJECTION, SOLUTION INTRAMUSCULAR; INTRAVENOUS; SUBCUTANEOUS EVERY 5 MIN PRN
Status: DISCONTINUED | OUTPATIENT
Start: 2024-09-19 | End: 2024-09-19 | Stop reason: HOSPADM

## 2024-09-19 RX ORDER — ACETAMINOPHEN AND CODEINE PHOSPHATE 300; 30 MG/1; MG/1
1 TABLET ORAL EVERY 4 HOURS PRN
COMMUNITY
Start: 2024-09-10

## 2024-09-19 RX ORDER — MIDAZOLAM HYDROCHLORIDE 1 MG/ML
INJECTION INTRAMUSCULAR; INTRAVENOUS AS NEEDED
Status: DISCONTINUED | OUTPATIENT
Start: 2024-09-19 | End: 2024-09-19 | Stop reason: SURG

## 2024-09-19 RX ORDER — ASPIRIN 325 MG
325 TABLET, DELAYED RELEASE (ENTERIC COATED) ORAL 2 TIMES DAILY
Status: DISCONTINUED | OUTPATIENT
Start: 2024-09-19 | End: 2024-09-20

## 2024-09-19 RX ORDER — FERROUS SULFATE 325(65) MG
325 TABLET ORAL DAILY
COMMUNITY
Start: 2024-09-10

## 2024-09-19 RX ORDER — METOCLOPRAMIDE HYDROCHLORIDE 5 MG/ML
10 INJECTION INTRAMUSCULAR; INTRAVENOUS EVERY 8 HOURS PRN
Status: DISCONTINUED | OUTPATIENT
Start: 2024-09-19 | End: 2024-09-19 | Stop reason: HOSPADM

## 2024-09-19 RX ORDER — PSEUDOEPHEDRINE HCL 30 MG
100 TABLET ORAL 2 TIMES DAILY
COMMUNITY
Start: 2024-09-10

## 2024-09-19 RX ORDER — FERROUS SULFATE 325(65) MG
325 TABLET, DELAYED RELEASE (ENTERIC COATED) ORAL
Status: DISCONTINUED | OUTPATIENT
Start: 2024-09-20 | End: 2024-09-20

## 2024-09-19 RX ORDER — DIPHENHYDRAMINE HCL 25 MG
25 CAPSULE ORAL EVERY 4 HOURS PRN
Status: DISCONTINUED | OUTPATIENT
Start: 2024-09-19 | End: 2024-09-20

## 2024-09-19 RX ORDER — METOCLOPRAMIDE HYDROCHLORIDE 5 MG/ML
10 INJECTION INTRAMUSCULAR; INTRAVENOUS EVERY 8 HOURS PRN
Status: DISCONTINUED | OUTPATIENT
Start: 2024-09-19 | End: 2024-09-20

## 2024-09-19 RX ORDER — ACETAMINOPHEN 500 MG
1000 TABLET ORAL ONCE
Status: DISCONTINUED | OUTPATIENT
Start: 2024-09-19 | End: 2024-09-19 | Stop reason: HOSPADM

## 2024-09-19 RX ORDER — SODIUM PHOSPHATE, DIBASIC AND SODIUM PHOSPHATE, MONOBASIC 7; 19 G/230ML; G/230ML
1 ENEMA RECTAL ONCE AS NEEDED
Status: DISCONTINUED | OUTPATIENT
Start: 2024-09-19 | End: 2024-09-20

## 2024-09-19 RX ORDER — CELECOXIB 200 MG/1
200 CAPSULE ORAL 2 TIMES DAILY
Status: DISCONTINUED | OUTPATIENT
Start: 2024-09-19 | End: 2024-09-20

## 2024-09-19 RX ORDER — ACETAMINOPHEN 325 MG/1
TABLET ORAL
Status: COMPLETED
Start: 2024-09-19 | End: 2024-09-19

## 2024-09-19 RX ORDER — DIPHENHYDRAMINE HYDROCHLORIDE 50 MG/ML
25 INJECTION INTRAMUSCULAR; INTRAVENOUS ONCE AS NEEDED
Status: ACTIVE | OUTPATIENT
Start: 2024-09-19 | End: 2024-09-19

## 2024-09-19 RX ORDER — ONDANSETRON 2 MG/ML
4 INJECTION INTRAMUSCULAR; INTRAVENOUS EVERY 6 HOURS PRN
Status: DISCONTINUED | OUTPATIENT
Start: 2024-09-19 | End: 2024-09-20

## 2024-09-19 RX ADMIN — PHENYLEPHRINE HCL 100 MCG: 10 MG/ML VIAL (ML) INJECTION at 09:43:00

## 2024-09-19 RX ADMIN — SODIUM CHLORIDE, SODIUM LACTATE, POTASSIUM CHLORIDE, CALCIUM CHLORIDE: 600; 310; 30; 20 INJECTION, SOLUTION INTRAVENOUS at 10:40:00

## 2024-09-19 RX ADMIN — TRANEXAMIC ACID: 10 INJECTION, SOLUTION INTRAVENOUS at 10:40:00

## 2024-09-19 RX ADMIN — PHENYLEPHRINE HCL 100 MCG: 10 MG/ML VIAL (ML) INJECTION at 09:25:00

## 2024-09-19 RX ADMIN — MIDAZOLAM HYDROCHLORIDE 2 MG: 1 INJECTION INTRAMUSCULAR; INTRAVENOUS at 09:07:00

## 2024-09-19 RX ADMIN — PHENYLEPHRINE HCL 100 MCG: 10 MG/ML VIAL (ML) INJECTION at 09:31:00

## 2024-09-19 RX ADMIN — TRANEXAMIC ACID 1000 MG: 10 INJECTION, SOLUTION INTRAVENOUS at 09:22:00

## 2024-09-19 NOTE — ANESTHESIA PREPROCEDURE EVALUATION
PRE-OP EVALUATION    Patient Name: Higinio Perea    Admit Diagnosis: OSTEOARTHRITIS RIGHT HIP    Pre-op Diagnosis: OSTEOARTHRITIS RIGHT HIP    RIGHT ANTERIOR TOTAL HIP ARTHROPLASTY    Anesthesia Procedure: RIGHT ANTERIOR TOTAL HIP ARTHROPLASTY (Right)    Surgeons and Role:     * Lombardi, John A, MD - Primary    Pre-op vitals reviewed.  Temp: 97.8 °F (36.6 °C)  Pulse: 54  Resp: 16  BP: 149/75  SpO2: 100 %  Body mass index is 26.79 kg/m².    Current medications reviewed.  Hospital Medications:   acetaminophen (Tylenol Extra Strength) tab 1,000 mg  1,000 mg Oral Once    lactated ringers infusion   Intravenous Continuous    tranexamic acid in sodium chloride 0.7% (Cyklokapron) 1000 mg/100mL infusion premix 1,000 mg  1,000 mg Intravenous Once    ceFAZolin (Ancef) 2g in 10mL IV syringe premix  2 g Intravenous Once    ceFAZolin (Ancef) 2 g/10mL IV syringe premix        clonidine/epinephrine/ropivacaine/ketorolac in 0.9% NaCl 60 mL pain cocktail syringe for hip arthroplasty   Infiltration Once (Intra-Op)       Outpatient Medications:     Medications Prior to Admission   Medication Sig Dispense Refill Last Dose    acetaminophen-codeine 300-30 MG Oral Tab Take 1 tablet by mouth every 4 (four) hours as needed.   post op    celecoxib 200 MG Oral Cap Take 1 capsule (200 mg total) by mouth daily.   post op    Ferrous Sulfate 325 (65 Fe) MG Oral Tab Take 1 tablet (325 mg total) by mouth daily.   post op    docusate sodium 100 MG Oral Cap Take 100 mg by mouth 2 (two) times daily.   post op    aspirin 325 MG Oral Tab Take 1 tablet (325 mg total) by mouth 2 (two) times daily.   post op    famotidine 20 MG Oral Tab Take 1 tablet (20 mg total) by mouth 2 (two) times daily.   9/19/2024 at 0430    alfuzosin ER 10 MG Oral Tablet 24 Hr Take 1 tablet (10 mg total) by mouth daily.   9/18/2024 at 1900    relugolix (ORGOVYX) 120 MG Oral Tab Take 1 tablet (120 mg total) by mouth daily.   9/19/2024 at 0430    IRBESARTAN 300 MG Oral Tab  TAKE 1 TABLET (300 MG TOTAL) BY MOUTH DAILY. 90 tablet 1 9/18/2024 at 0800    AMLODIPINE 10 MG Oral Tab TAKE 1 TABLET EVERY DAY 90 tablet 1 9/19/2024 at 0430    CHLORTHALIDONE 25 MG Oral Tab TAKE 1 TABLET (25 MG TOTAL) BY MOUTH DAILY. 90 tablet 3 9/19/2024 at 0430    rosuvastatin 20 MG Oral Tab Take 1 tablet (20 mg total) by mouth nightly. 90 tablet 1 9/18/2024 at 1900    acetaminophen 500 MG Oral Tab Take 1 tablet (500 mg total) by mouth every 6 (six) hours as needed for Pain.   Past Week    Cholecalciferol (VITAMIN D) 1000 UNITS Oral Cap Take 1 tablet by mouth daily.   9/9/2024    MULTIVITAMINS OR Take 1 tablet by mouth daily.   9/9/2024    SILDENAFIL CITRATE 100 MG Oral Tab Take 1 tablet (100 mg total) by mouth daily as needed for Erectile Dysfunction. 10 tablet 0        Allergies: Norco [hydrocodone-acetaminophen]      Anesthesia Evaluation    Patient summary reviewed.    Anesthetic Complications           GI/Hepatic/Renal      (+) GERD                           Cardiovascular                  (+) hypertension     (+) CAD                                Endo/Other                                  Pulmonary                           Neuro/Psych                                      Past Surgical History:   Procedure Laterality Date    Colonoscopy  2005    Colonoscopy  05/01/2014    Procedure: COLONOSCOPY;  Surgeon: Shiva Angel MD;  Location:  ENDOSCOPY    Colonoscopy N/A 10/23/2017    Procedure: COLONOSCOPY;  Surgeon: Shiva Angel MD;  Location:  ENDOSCOPY    Hernia surgery      Hip replacement surgery  06/26/2018    left     Melanoma monitor profile      Other surgical history  2010    Rt shoulder RCR    Other surgical history  2009    bilateral knee arthroscopies     Social History     Socioeconomic History    Marital status:    Tobacco Use    Smoking status: Former     Current packs/day: 0.00     Average packs/day: 1 pack/day for 20.0 years (20.0 ttl pk-yrs)     Types: Cigarettes     Start  date: 1965     Quit date: 1985     Years since quittin.7    Smokeless tobacco: Former   Substance and Sexual Activity    Alcohol use: Yes     Alcohol/week: 7.0 standard drinks of alcohol     Types: 7 Standard drinks or equivalent per week    Drug use: No    Sexual activity: Yes     Partners: Female     History   Drug Use No     Available pre-op labs reviewed.               Airway      Mallampati: I  Mouth opening: >3 FB  TM distance: > 6 cm  Neck ROM: full Cardiovascular    Cardiovascular exam normal.  Rhythm: regular  Rate: normal     Dental             Pulmonary    Pulmonary exam normal.                 Other findings              ASA: 2   Plan: MAC and spinal  NPO status verified and patient meets guidelines.          Plan/risks discussed with: patient                Present on Admission:  **None**

## 2024-09-19 NOTE — ANESTHESIA PROCEDURE NOTES
Spinal Block    Date/Time: 9/19/2024 9:07 AM    Performed by: Anthony Bennett MD  Authorized by: Anthony Bennett MD      General Information and Staff    Start Time:  9/19/2024 9:07 AM  End Time:  9/19/2024 9:14 AM  Anesthesiologist:  Anthony Bennett MD  Performed by:  Anesthesiologist  Patient Location:  OR  Site identification: surface landmarks    Preanesthetic Checklist: patient identified, IV checked, risks and benefits discussed, monitors and equipment checked, pre-op evaluation, timeout performed, anesthesia consent and sterile technique used      Procedure Details    Patient Position:  Sitting  Prep: ChloraPrep    Monitoring:  Cardiac monitor, heart rate and continuous pulse ox  Approach:  Midline  Location:  L3-4  Injection Technique:  Single-shot    Needle    Needle Type:  Sprotte  Needle Gauge:  24 G  Needle Length:  3.5 in    Assessment    Sensory Level:  T8  Events: clear CSF, CSF aspirated, well tolerated and blood negative      Additional Comments

## 2024-09-19 NOTE — CM/SW NOTE
09/19/24 1500   CM/SW Referral Data   Referral Source Physician   Reason for Referral Discharge planning   Informant EMR;Clinical Staff Member   Discharge Needs   Anticipated D/C needs Home health care   Choice of Post-Acute Provider   Informed patient of right to choose their preferred provider Yes   Patient/family choice [facility]     Protocol order received for surgery.  Pt is a 79 year old male admitted for R Anterior RUTH ANN.  He was set up with [facility] pre-operatively    / to remain available for support and/or discharge planning.     Monica Alcazar MBA MSN, RN CTL/  [id number]

## 2024-09-19 NOTE — ANESTHESIA POSTPROCEDURE EVALUATION
[facility]    [facility]:  Outpatient in a [facility]   Age/Gender 79 year old male MRN [id number]   Location [facility] SURGERY Attending Lombardi, John A, MD   Hosp Day # 0 PCP Madalyn Chamberlain MD       Anesthesia Post-op Note    RIGHT ANTERIOR TOTAL HIP ARTHROPLASTY    Procedure Summary       Date: 09/19/24 Room / Location:  MAIN OR 12 / [facility] MAIN OR    Anesthesia Start: 0907 Anesthesia Stop: 1040    Procedure: RIGHT ANTERIOR TOTAL HIP ARTHROPLASTY (Right: Hip) Diagnosis: (OSTEOARTHRITIS RIGHT HIP)    Surgeons: Lombardi, John A, MD Anesthesiologist: Anthony Bennett MD    Anesthesia Type: MAC, spinal ASA Status: 2            Anesthesia Type: MAC, spinal    Vitals Value Taken Time   /55 09/19/24 1041   Temp 98.3 °F (36.8 °C) 09/19/24 1041   Pulse 52 09/19/24 1041   Resp 16 09/19/24 1041   SpO2 99 % 09/19/24 1041       Patient Location: PACU    Anesthesia Type: spinal and MAC    Airway Patency: patent    Postop Pain Control: adequate    Mental Status: mildly sedated but able to meaningfully participate in the post-anesthesia evaluation    Nausea/Vomiting: none    Cardiopulmonary/Hydration status: stable euvolemic    Complications: no apparent anesthesia related complications    Postop vital signs: stable    Dental Exam: Unchanged from Preop    Patient to be discharged from PACU when criteria met.

## 2024-09-19 NOTE — PLAN OF CARE
NURSING ADMISSION NOTE      Patient admitted via bed.  Oriented to room.  Safety precautions initiated.  Bed in low position.  Call light in reach.  Wife at bedside.    Ivf infusing.  Right hip dressing aquacel clean, dry, intact.  Gel ice wrap to right hip.  Color, movement, sensation intact to ble.  2 person skin assessment completed with LW Pct.  Skin intact.  Instructed on plan of care, call for all asst needed, discomfort felt, call don't fall protocol.  Verbalized understanding.

## 2024-09-19 NOTE — DISCHARGE INSTRUCTIONS
Sometimes managing your health at home requires assistance.   The [facility] team has recognized your preference to use [facility].  They can be reached by phone at [phone].  The fax number for your reference is [phone].  A representative from the home health agency will contact you or your family to schedule your first visit.      \  Hip Replacement Discharge Instructions    Dear Patient,     [facility] cares about your progress with recovery following your joint replacement surgery.     300 days from your scheduled surgery, [facility] will send you a follow-up survey to help us understand how your surgery impacted your mobility, pain, and overall quality of life. Please make every effort to complete this survey. The information collected from this survey will be used by your physician to track your recovery.     Sincerely,     [facility] [place]    Activity    Bathing  No tub baths, pools, or saunas until cleared by surgeon (about 4-6 weeks because it takes that long for the incision on the skin to heal and be a barrier to prevent infection.)  When allowed to shower:      AQUACEL dressing is waterproof and does not require being covered before showering.  Pat dressing and surrounding skin dry after shower                      AQUACEL        MEDIPORE/COVERLET dressing is NOT waterproof and REQUIRES being covered with a waterproof barrier to keep the dressing and incision dry.  SARAN WRAP, GLAD WRAP, PRESS N SEAL WORK REALLY WELL BUT ANY PLASTIC WRAP WILL DO.  Do not wash incision.   Remove entire wrapping and old dressing (if Medipore/coverlet) after showering. Pat dry with a CLEAN TOWEL if necessary and cover incision with new Medipore/coverlet. For other types of dressings, follow surgeon’s orders.                  MEDIPORE/COVERLET            Driving  Do not drive until cleared by surgeon. This is usually four to six weeks after  surgery. Discuss this at follow-up office visit.   Not allowed while taking narcotic pain medication or muscle relaxants.    Sex  Usually allowed after four to six weeks - check with surgeon at your office visit.  Return to work  Usually allowed after four to six weeks. Discuss specific work activities with your surgeon.    Restrictions  For hip replacement surgery, follow instructions provided by physical therapy    No smoking  Avoid smoking. It is known to cause breathing problems and can decrease the rate of healing.    Incision care/Dressing changes  Wash hands before and after dressing changes.    FOR MEDIPORE/COVERLET DRESSINGS:  Change dressing daily using Medipore/coverlet once Aquacel (waterproof) dressing is removed (which is about 7 days after surgery). Patient should be standing or lying flat so dressing goes on smoothly.  (This dressing needed for hip patients because of location of incision-don’t want contamination from bathroom use)   Continue this until your first visit with your surgeon’s office.  There could be a small amount of redness around the staples or incision; this is normal.  Watch for increased redness, warmth, any odor, increased drainage or opening of the incision. A little clear yellow or blood tinged drainage is normal up to 2 weeks after surgery but it should be less every day until it stops.  Call physician if you notice any concerning changes.  Sutures/staples will be removed at first office visit (10 days- 3 weeks).                         MEDIPORE /COVERLET          Medication  Anticoagulants = blood thinners (Xarelto, Eliquis, Lovenox, Coumadin or Aspirin)  Pill or shot form depending on what your physician orders.   IF placed on Coumadin, you may also need lab work done for monitoring.  You will bleed easier and bruise easier while on these medications.     Usually you will be on a blood thinner for about 4-5 weeks.  Contact your physician if you have signs of bruising, nose  bleeds or blood in your urine. Use electric razors and soft toothbrushes only.  Do not take aspirin while taking blood thinners unless ordered by your physician.  Review anticoagulant education information sheet provided.    Discomfort  Surgical discomfort is normal for one to two months.  Have realistic goals and keep a positive outlook.  Keep pain manageable; pain should not disrupt your sleep or activities like getting out of bed or walking.  You may need pain medication regularly (every 4-6 hours) the first 2 weeks and then begin to decrease how often you are taking it.  Take pain medication as prescribed with food, especially before therapy, allowing 30-60 minutes to take effect.  Do not drink alcohol while on pain medication.  As you have less discomfort, decrease the amount of pain medication you take. Use plain Tylenol (acetaminophen) for less severe pain.  Some pain medications have Tylenol (acetaminophen) in them such as Norco and Percocet. Do NOT take Tylenol (acetaminophen) within 4 hours of a dose of these medications.  Apply ice  or cold therapy to surgical site for 20 minutes at least four times a day, especially after therapy. Be sure there is a thin cloth barrier between skin and ice or cold therapy.  Change position at least every 45 minutes while awake to avoid stiffness or increased discomfort.  Deep breathing and relaxation techniques and distractions can help!  If you focus on something else, you do not experience the pain the same. Take advantage of everything available to your to help control you discomfort.  Contact physician if discomfort does not respond to pain medication.    Body changes  Constipation is common with the use of narcotics.  Eat fiber rich foods and drink plenty of fluids.  Use stool softeners such as Colace or Senakot while on narcotics, and laxatives such as Miralax or Milk of Magnesia if needed.   An enema or suppository may be needed if above measures do not  work.    Prevention of infection and promotion of healing  Good hand washing is important. Everyone should wash their hands or use hand  as soon as they walk in your house-whether they live there or are visiting.  Keep bed linen/clothing freshly laundered.  Do not allow others or pets to touch your incision.  Avoid people that have colds or the flu.  Your surgeon may recommend that you take antibiotics before you undergo any dental or other invasive surgical procedures after your joint replacement. Speak with your physician about this at your post-op office visit.  Eat a balanced diet high in fiber and drink plenty of fluids.   Continue using incentive spirometry because narcotics make you sleepy so you may not take good deep breaths. We do not want you to get pneumonia.     Post op Office visits  Schedule 10 days to 3 weeks after surgery WITH SURGEON’s office.  Additional visits may need to be scheduled. Your physician will discuss this at first post-op office visit.  Schedule outpatient physical therapy per your surgeon’s orders.  Schedule one week follow up after surgery WITH PRIMARY CARE PHYSICIAN; review your medications over last 6 months.  Your body gets stressed by surgery and that stress can affect all your other health issues (such as high blood pressure, diabetes, CHF, afib, and asthma just to name a few).  We don’t want those other health issues to cause you to get readmitted to the hospital; much better for you to catch developing problems and prevent them from becoming larger ones.  BILL HOSE - IF ordered by your surgeon, wear these during the day and off at night.  Surgeon will tell you when you don’t need them anymore.    Notify your surgeon if you notice any of the following signs  Separation of incision line.  Increased redness, swelling, or warmth of skin around incision.  Increased or foul smelling drainage from incision  Red streaks on skin near incision.  Temperature >100.4F.  Increased  pain at incision not relieved by pain medication.    Signs of Possible Dislocation  Increased severe leg or groin pain  Turning in or out of surgical leg that is new  Increased numbness, tingling to leg  Inability to walk or put weight on your surgical leg        Signs of blood clot  Pain, excessive tenderness, redness, or swelling in leg or calf (other than incision site).    Go directly to the ER or CALL 911 if  you:  become short of breath  have chest pain  cough up blood  have unexplained anxiety with breathing   Traveling and Handicapped parking  Check with you surgeon when you are allowed to travel so you don’t set yourself up for greater chance of complications.  If traveling by car, get out to stretch every 2 hours.  This helps prevent stiffness.  You may need to do this any time you travel for the first year after surgery.  If traveling by plane, BEFORE you get into a security line, let them know that you had your hip replaced, as you will most likely set off the metal detector. The doctors no longer provide an identification card for this as they are easily copied. ALSO request a wheelchair the first year to board and get off a plane…this aids in priority seating and you should sit on the aisle or at the bulkhead where you can easily stretch your legs and get up to walk up and down the aisles…this helps prevent blood clots and stiffness.  TEMPORARY HANDICAP PARKING APPLICATION  (good for 3-6 months)  - At Surgeon or PCP visit, request they fill out the form, then go to [employer] (only time you do not wait in a long line there). Some [facility] offices provide the same service. (Rebeca Huang and [place] have this service; if you live in another [facility], you may check with them as well). You need space to open car doors to position yourself properly with walker to get in and out of your car safely; some parking spaces are  practically on top of each other and do not give you enough room.     SPECIAL   INSTRUCTIONS:  Spanda- hip replacement(single layer compression sleeve):     All patients should wear the compression sleeve until first follow up visit to surgeon’s office (about 2-3 weeks) and then check with surgeon if need to continue use.  Take off to shower. Best to keep on as much as possible, even at night, except when washing out.  Wash with mild soap and water; DRIP dry overnight- put back on before getting up for the day.  Use one long tube on the calf.   It should end up just below the back of the knee and the other end on the ball of the foot to expose the toes.   Makes sure it is NOT bunched up anywhere.  IF the Spanda- feels TOO tight, then REMOVE it and call your surgeon to let them know.

## 2024-09-19 NOTE — OPERATIVE REPORT
PATIENT'S NAME: Higinio Perea   ATTENDING PHYSICIAN: John A Lombardi, MD   OPERATING PHYSICIAN: John A Lombardi, MD   PATIENT ACCOUNT#:   [id number]    LOCATION:  [facility] SURGERY  MEDICAL RECORD #:   [id number]    YOB: 1945  ADMISSION DATE:  9/19/2024           OPERATION DATE:  9/19/2024     OPERATIVE REPORT     PREOPERATIVE DIAGNOSIS:  Right hip primary osteoarthritis.  POSTOPERATIVE DIAGNOSIS: Right hip primary osteoarthritis.  PROCEDURE PERFORMED:  Right anterior total hip arthroplasty with Actis size 7 high offset stem, 36 mm ceramic head with 1.5 neck, size 56 Gription 100 series cup with +4 polyethylene liner.     ASSISTANT:  JANES Banerjee    Skilled assistance was needed for patient positioning, prepping and draping, instrument holding and passing, retracting and suturing.     ANESTHESIA:  Spinal with regional block.      INDICATIONS:  This is a 79 yrs old White male who presents with ongoing pain and difficulties with the hip consistent with the above diagnosis with failed conservative care.  I reviewed the indications and benefits of the procedure.  The patient understood and agreed to proceed.     PROCEDURE:  The patient was taken to the operating room and placed in the supine position after administration of spinal anesthesia.  A regional block was performed at the lower extremity.    Patient was placed on the Hawks table in appropriate position with both lower extremities and the foot holders with appropriate padding.  A timeout was performed confirming the appropriate operative site, the patient received IV antibiotics preoperatively.  The hip was prepped and draped in the usual sterile fashion.  Preoperative x-rays were obtained using C arm to confirm appropriate pelvis position and obtain a baseline x-ray of the involved hip.  A knife was used to make a short longitudinal incision at the anterior aspect of the hip.  The Bovie was used for subcutaneous dissection and  hemostasis.  Dissection was carried down to the fascia of the tensor musculature and the knife used to incise the fascia in line with the skin incision.  A clamp was used with digital palpation to undermine the fascia and bluntly dissected the medial border of the tensor muscle.  Blunt dissection was then used to confirm anterior hip capsule.  The circumflex vessels of the inferior aspect of the wound were cauterized and divided.  After adequate exposure of the capsule the Bovie was used to incise the capsule in line with the femoral neck.  Tacking sutures were placed superiorly and inferiorly and the capsule divided gaining adequate exposure to the head neck region.  Once adequate release was performed, the saw was used to resect the femoral head at the appropriate level without difficulty.  Irricept irrigation was then placed into the wound allowed to sit for 1 minute before evacuating the solution.  At this point with tractors were placed anteriorly and posteriorly at the acetabulum and soft tissue dissection was carried out to the acetabular rim was well visualized.  The reamers were then used in a sequential fashion up to size 55 mm with good bleeding bone and the trial implant was impacted in place with good fixation.  C-arm was used to confirm appropriate alignment of the trial implant and that it was well-seated.  The trial cup was removed and the size 56 mm cup was impacted in place with good fixation in good position, again confirmed with C arm.  After placing the central hole eliminator the +4 polyethylene liner was placed with good fixation.  Attention was turned to the femur.  Appropriate soft tissue release was performed to mobilize the femur for adequate access.  The leg was placed appropriately in extension external rotation and adduction, the femoral hook was used to elevate the proximal femur appropriately with careful attention to the tension on the retractor.  The cookie cutter was then used to  gain lateral access to the canal.  The starting broach followed by the sequential broaches were used up to size 7 which was found to be the appropriate fit, confirming this with C arm, and the broach was left in place for trialing.  The calcar reamer was used.  A high offset neck and 1.5 head were placed and the hip was reduced.  The C-arm was then used to obtain AP pelvis and AP hip views which were compared to our preoperative x-rays to confirm appropriate length offset and implant position.  Stability was evaluated and found to be appropriate.  The hip was then dislocated and the trial implants removed.  The stem was placed down the canal and impacted with good fixation.  After cleaning the Sandhu taper the final head was placed and impacted with good fixation.  The hip was then reduced and final x-ray obtained.  Aricept irrigation was again placed in the wound allowed to sit for 1 minute and then the solution evacuated.  #2 Orthocord suture was used in a figure-of-eight fashion to close the remaining capsule.   Closure was then performed with a #1 Vicryl suture in a running fashion to close the fascia.  Subcutaneous tissue was closed with inverted 2-0 Vicryl sutures and the skin was closed with a running subcuticular 3-0 Monocryl suture.  A sterile dressing was placed.  The patient tolerated the procedure well.  There were no complications.  Blood loss was approximately 50 ml.  The patient was taken to Recovery in stable condition.

## 2024-09-19 NOTE — BRIEF OP NOTE
Pre-Operative Diagnosis: OSTEOARTHRITIS RIGHT HIP     Post-Operative Diagnosis: OSTEOARTHRITIS RIGHT HIP      Procedure Performed:   RIGHT ANTERIOR TOTAL HIP ARTHROPLASTY    Surgeons and Role:     * Lombardi, John A, MD - Primary    Assistant(s):  Surgical Assistant.: Roberto Styles: Arley Child APN     Surgical Findings: OA     Specimen: bone     Estimated Blood Loss: Blood Output: 50 mL (9/19/2024 10:17 AM)      Dictation Number:  [id number]    John A Lombardi, MD  9/19/2024  10:32 AM

## 2024-09-19 NOTE — PHYSICAL THERAPY NOTE
PHYSICAL THERAPY JOINT EVALUATION - INPATIENT     Room Number: 377/377-A  Evaluation Date: 9/19/2024  Type of Evaluation: Initial  Physician Order: PT Eval and Treat    Presenting Problem: s/p R ant RUTH ANN  Co-Morbidities : Mixed HLD, CKD3, GERD, s/p L RUTH ANN 2018, PVD  ,  prostate cancer  Reason for Therapy: Mobility Dysfunction and Discharge Planning    Procedure Performed 9/19/24 Dr Lombardi:   RIGHT ANTERIOR TOTAL HIP ARTHROPLASTY    PHYSICAL THERAPY ASSESSMENT   Patient is currently functioning near baseline with bed mobility, transfers, gait, stair negotiation, maintaining seated position, standing prolonged periods, and performing household tasks. Prior to admission, patient's baseline is indep.   Patient is requiring stand-by assist and contact guard assist as a result of the following impairments: decreased functional strength, decreased endurance/aerobic capacity, pain, impaired static and dynamic standing balance, impaired coordination, impaired motor planning, decreased muscular endurance, and limited RLE ROM.  Physical Therapy will continue to follow for duration of hospitalization.    Patient will benefit from continued skilled PT Services at discharge to promote prior level of function and safety with additional support and return home with home health PT.    PLAN  PT Treatment Plan: Bed mobility;Body mechanics;Coordination;Endurance;Energy conservation;Patient education;Family education;Gait training;Neuromuscular re-educate;Range of motion;Strengthening;Stoop training;Stair training;Transfer training;Balance training  Rehab Potential : Good  Frequency (Obs): Daily  Number of Visits to Meet Established Goals: 2    CURRENT GOALS  Goal #1  Patient is able to demonstrate supine - sit EOB @ level: supervision     Goal #2  Patient is able to demonstrate transfers Sit to/from Stand at assistance level: supervision   Goal #3    Patient is able to ambulate 150 feet with assistive device at assistance level:  supervision   Goal #4    Patient will negotiate 4 stairs/one curb w/ assistive device and supervision   Goal #5    Patient verbalizes and/or demonstrates all precautions and safety concerns independently    Goal #6      Goal Comments: Goals established on 9/19/2024    PHYSICAL THERAPY MEDICAL/SOCIAL HISTORY  History related to current admission: Patient is a 79 year old male admitted on 9/19/2024 from home for R ant RUTH ANN.    HOME SITUATION  Type of Home: House   Home Layout: One level  Stairs to Enter : 3  Railing: Yes          Lives With: Spouse  Drives: Yes  Patient Owned Equipment: Rolling walker (hip kit)  Patient Regularly Uses: Reading glasses    Prior Level of [place]:   Per pt lives in ranch style home with spouse (Brittany).Spouse able to assist as needed. Home with 3 steps to enter. Indep at baseline, but does own RW. No hx of falls. Owns hip kit. Retired . Enjoys golfing.   Spouse reports pt walks out-toed at baseline.      SUBJECTIVE  \"One of us has to be a smartass.\"    OBJECTIVE  Precautions: RUTH ANN - anterior;Bed/chair alarm (+ NO ACTIVE HIP ABD)  Fall Risk: Standard fall risk    WEIGHT BEARING RESTRICTION  Weight Bearing Restriction: R lower extremity        R Lower Extremity: Weight Bearing as Tolerated       PAIN ASSESSMENT  Rating: Unable to rate  Location: right hip  Management Techniques: Body mechanics;Activity promotion;Repositioning    COGNITION  Overall Cognitive Status:  WFL - within functional limits    RANGE OF MOTION AND STRENGTH ASSESSMENT  Upper extremity ROM and strength are within functional limits   Lower extremity ROM is within functional limits   Lower extremity strength is within functional limits     BALANCE  Static Sitting: Good  Dynamic Sitting: Fair +  Static Standing: Fair  Dynamic Standing: Fair -    ADDITIONAL TESTS                                    ACTIVITY TOLERANCE                         O2 WALK       NEUROLOGICAL FINDINGS                        AM-PAC '6-Clicks'  INPATIENT SHORT FORM - BASIC MOBILITY  How much difficulty does the patient currently have...  Patient Difficulty: Turning over in bed (including adjusting bedclothes, sheets and blankets)?: A Little   Patient Difficulty: Sitting down on and standing up from a chair with arms (e.g., wheelchair, bedside commode, etc.): A Little   Patient Difficulty: Moving from lying on back to sitting on the side of the bed?: A Little   How much help from another person does the patient currently need...   Help from Another: Moving to and from a bed to a chair (including a wheelchair)?: A Little   Help from Another: Need to walk in hospital room?: A Little   Help from Another: Climbing 3-5 steps with a railing?: A Little       AM-PAC Score:  Raw Score: 18   Approx Degree of Impairment: 46.58%   Standardized Score (AM-PAC Scale): 43.63   CMS Modifier (G-Code): CK    FUNCTIONAL ABILITY STATUS  Gait Assessment   Functional Mobility/Gait Assessment  Gait Assistance: Supervision;Contact guard assist  Distance (ft): 150  Assistive Device: Rolling walker  Pattern: R Decreased stance time    Skilled Therapy Provided    Bed Mobility:  Rolling: NT  Supine to sit: w/HOB elevated  supervision    Sit to supine: NT     Transfer Mobility:  Sit to stand: CGA to RW   Stand to sit: CGA  Gait = CGA progressing to supervision w/ rw X 150 feet, decr RLE stance time,  step to pattern, incr reliance BUE for offloading.    Therapist's Comments:   Patient presents sitting up in bed. Spouse present bedside. Discussed role and goal of physical therapy in hospital setting. Pt in agreement to session. Educated on anterior hip precautions + NO active hip abd and WBAT to RLE; handout provided. Pt verbalizes understanding.  Bed mobility with HOB elevated at supervision. Sitting EOB denies dizziness/lightheadedness. Sit to stand to RW CGA, v/c for hand placement. Ambulated 150 feet at CGA, progressing to supervision w/ RW x 150 feet- decr RLE stance time, step to  pattern, incr reliance BUE for offloading. Upright in chair at end of session. Discussed importance of continued out of bed mobility. Encouraged  continued ambulation with nursing staff 3-4 times daily. Encourage use of icec for pain and swelling management. Pt and spouse verbalize understanding. RN aware.    Exercise/Education Provided:  Bed mobility  Body mechanics  Energy conservation  Functional activity tolerated  Gait training  Posture  Transfer training    Patient End of Session: Up in chair;Needs met;Call light within reach;RN aware of session/findings;All patient questions and concerns addressed;SCDs in place;Ice applied;Alarm set;Discussed recommendations with /    Patient Evaluation Complexity Level:  History Moderate - 1 or 2 personal factors and/or co-morbidities   Examination of body systems Low -  addressing 1-2 elements   Clinical Presentation Low- Stable   Clinical Decision Making Low Complexity     PT Session Time: 30 minutes  Gait Training: 15 minutes

## 2024-09-19 NOTE — H&P
Office Visit  9/3/2024  Internal Medicine - [facility] [place]  Higinio Perea - 79 y.o. Male; born [birth year]Encounter Summary, generated on Sep. 19, 2024September 19, 2024   Reason for Visit    Reason for Visit -  Reason Comments   Pre-Op Patient is scheduled for right hip replacement on 9/19/24 with Dr. John Lombardi     Encounter Details    Encounter Details  Date Type Department Care Team (Latest Contact Info) Description   09/03/2024 12:00 PM CDT Office Visit Internal Medicine - [facility] [place]   [address]   [address]   [place] [address]   [phone]  Madalyn Chamberlain MD   [address]   [address]   [place] [address]   [phone] (Work)   [phone] (Fax)  Preop exam for internal medicine (Primary Dx)     Social History  - documented as of this encounter  Social History  Tobacco Use Types Packs/Day Years Used Date   Smoking Tobacco: Former Cigarettes 1 20 01/01/1965 - 01/01/1985   Smokeless Tobacco: Former       Quit: 2023     Social History  Alcohol Use Standard Drinks/Week Comments   Yes 7 (1 standard drink = 0.6 oz pure alcohol) a drink with dinner     Social History  Humiliation, Afraid, Rape, and Kick questionnaire Answer Date Recorded   Within the last year, have you been afraid of your partner or ex-partner? No 02/28/2022   Within the last year, have you been humiliated or emotionally abused in other ways by your partner or ex-partner? No 02/28/2022   Within the last year, have you been kicked, hit, slapped, or otherwise physically hurt by your partner or ex-partner? No 02/28/2022   Within the last year, have you been raped or forced to have any kind of sexual activity by your partner or ex-partner? No 02/28/2022     Social History  Social Connection and Isolation Panel [NHANES] Answer Date Recorded   In a typical week, how many times do you talk on the phone with family, friends, or neighbors? More than three times a week 02/28/2022   How  often do you get together with friends or relatives? More than three times a week 02/28/2022   How often do you attend Latter day or Protestant services? Never 02/28/2022   Do you belong to any clubs or organizations such as Latter day groups, unions, fraternal or athletic groups, or school groups? Yes 02/28/2022   How often do you attend meetings of the clubs or organizations you belong to? More than 4 times per year 02/28/2022   Are you , , , , never , or living with a partner?  02/28/2022     Social History  AUDIT-C Answer Date Recorded   Q1: How often do you have a drink containing alcohol? 4 or more times a week 02/28/2022   Q2: How many drinks containing alcohol do you have on a typical day when you are drinking? Patient declined 02/28/2022   Q3: How often do you have six or more drinks on one occasion? Patient declined 02/28/2022     Social History  Overall Financial Resource Strain (CARDIA) Answer Date Recorded   How hard is it for you to pay for the very basics like food, housing, medical care, and heating? Not hard at all 02/28/2022     Social History  PHQ-2 Answer Date Recorded   PHQ-2 SCORE 0 01/18/2024     Social History  [place] [employer] - Occupational Stress Questionnaire Answer Date Recorded   Do you feel stress - tense, restless, nervous, or anxious, or unable to sleep at night because your mind is troubled all the time - these days? Not at all 02/28/2022     Social History  Exercise Vital Sign Answer Date Recorded   On average, how many days per week do you engage in moderate to strenuous exercise (like a brisk walk)? 4 days 02/28/2022   On average, how many minutes do you engage in exercise at this level? 60 min 02/28/2022     Social History  Hunger Vital Sign Answer Date Recorded   Within the past 12 months, you worried that your food would run out before you got the money to buy more. Never true 02/28/2022   Within the past 12 months,  the food you bought just didn't last and you didn't have money to get more. Never true 02/28/2022     Social History  PRAPARE - Transportation Answer Date Recorded   In the past 12 months, has lack of transportation kept you from medical appointments or from getting medications? No 02/28/2022   In the past 12 months, has lack of transportation kept you from meetings, work, or from getting things needed for daily living? No 02/28/2022     Social History  Housing Stability Vital Sign Answer Date Recorded   In the last 12 months, was there a time when you were not able to pay the mortgage or rent on time? No 02/28/2022   In the last 12 months, how many places have you lived? 1 02/28/2022   In the last 12 months, was there a time when you did not have a steady place to sleep or slept in a shelter (including now)? No 02/28/2022     Social History  Sex and Gender Information Value Date Recorded   Sex Assigned at Birth Not on file     Gender Identity Not on file     Sexual Orientation Not on file       Last Filed Vital Signs  - documented in this encounter  Last Filed Vital Signs  Vital Sign Reading Time Taken Comments   Blood Pressure 126/70 09/03/2024 11:58 AM CDT     Pulse 80 09/03/2024 11:58 AM CDT     Temperature 36.6 °C (97.9 °F) 09/03/2024 11:58 AM CDT     Respiratory Rate - -     Oxygen Saturation 96% 09/03/2024 11:58 AM CDT     Inhaled Oxygen Concentration - -     Weight 86.7 kg (191 lb 3.2 oz) 09/03/2024 11:58 AM CDT     Height 180.3 cm (5' 11\") 09/03/2024 11:58 AM CDT     Body Mass Index 26.67 09/03/2024 11:58 AM CDT       Functional Status  - documented as of this encounter  Functional Status  Functional Status Response Date of Assessment   Hearing Problems? No 01/18/2024   Vision Problems? No 01/18/2024   Difficulty walking? No 01/18/2024   Difficulty dressing or bathing? No 01/18/2024   Problems with daily activities? No 01/18/2024     Functional Status  Cognitive Status Response Date of Assessment   Memory  Problems? No 01/18/2024     Progress Notes  - documented in this encounter  Table of Contents for Progress Notes   Madalyn Chamberlain MD - 09/03/2024 12:00 PM CDT   Matt Prabhakar CMA - 09/03/2024 12:00 PM CDT      Madalyn Chamberlain MD - 09/03/2024 12:00 PM CDT  Formatting of this note is different from the original.  Higinio Perea is a 79 year old male. Patient presents with:  Pre-Op: Patient is scheduled for right hip replacement on 9/19/24 with Dr. John Lombardi    Current Outpatient Medications  Medication Sig Dispense Refill  amLODIPine 10 MG Oral Tab TAKE 1 TABLET EVERY DAY 90 tablet 0  Meloxicam 15 MG Oral Tab Take 1 tablet (15 mg total) by mouth daily. 30 tablet 1  rosuvastatin 20 MG Oral Tab TAKE 1 TABLET EVERY NIGHT 100 tablet 1  CHLORTHALIDONE 25 MG Oral Tab TAKE 1 TABLET EVERY DAY 90 tablet 3  Mometasone Furoate 0.1 % External Cream Bid 60 g 3  Irbesartan 300 MG Oral Tab TAKE 1 TABLET EVERY  tablet 2  fluticasone propionate 50 MCG/ACT Nasal Suspension 2 sprays by Nasal route daily. 16 g 3  relugolix (ORGOVYX) 120 MG Oral Tab Take 1 tablet (120 mg total) by mouth daily. 30 tablet 11  alfuzosin ER 10 MG Oral Tablet 24 Hr Take 1 tablet (10 mg total) by mouth daily. 90 tablet 3  Sildenafil Citrate 100 MG Oral Tab Take 1 tablet (100 mg total) by mouth daily as needed for Erectile Dysfunction. 10 tablet 3  famotidine 40 MG Oral Tab Take 1 tablet (40 mg total) by mouth daily. 90 tablet 1  acetaminophen 500 MG Oral Tab Take 500 mg by mouth every 6 (six) hours as needed for Pain. Pt reports 2 pills BID  Cholecalciferol (VITAMIN D) 1000 UNITS Oral Cap 1 CAPSULE DAILY  MULTIVITAMINS OR daily      Norco [Hydrocodone-* RASH  Comment:Pt broke out in small rash  Past Medical History:  Diagnosis Date  Bladder cancer (HCC)  CKD (chronic kidney disease) stage 2, GFR 60-89 ml/min 12/12/2017  Disorder of kidney and ureter  High blood pressure  High cholesterol  Mixed hyperlipidemia  Nontraumatic complete tear of left  rotator cuff 2019  Osteoarthritis  left hip  Prostate cancer (HCC)  Skin cancer (melanoma) (HCC) 1980s  Syncopal episodes 04/2018  x 1  Unspecified essential hypertension  Visual impairment  glasses  Vitamin D deficiency 2011    Past Surgical History:  Procedure Laterality Date  ARTHROSCOPY OF JOINT UNLISTED Bilateral  knee  CATARACT Bilateral 2023  COLONOSCOPY 2005  COLONOSCOPY 2014  Procedure: COLONOSCOPY; Surgeon: Shiva Angel MD; Location:  ENDOSCOPY  COLONOSCOPY N/A 10/23/2017  Procedure: COLONOSCOPY; Surgeon: Shiva Angel MD; Location:  ENDOSCOPY  CYSTOSCOPY,INSERT URETERAL STENT 2023  HERNIA SURGERY  HIP REPLACEMENT SURGERY 2018  left  MELANOMA MONITOR PROFILE  OTHER SURGICAL HISTORY   Rt shoulder RCR  OTHER SURGICAL HISTORY   bilateral knee arthroscopies  OTHER SURGICAL HISTORY 2024  Dr. Little - Cystoscopy  TOTAL HIP REPLACEMENT Left    Family History  Problem Relation Age of Onset  Diabetes Father  Heart Disorder Father  Hypertension Father  Lipids Father  Lipids Mother  Heart Disorder Mother  mi@39    Social History  Socioeconomic History  Marital status:   Tobacco Use  Smoking status: Former  Packs/day: 0.00  Years: 1 pack/day for 20.0 years (20.0 ttl pk-yrs)  Types: Cigarettes  Start date: 1965  Quit date: 1985  Years since quittin.6  Smokeless tobacco: Former  Quit date:   Vaping Use  Vaping status: Never Used  Substance and Sexual Activity  Alcohol use: Yes  Alcohol/week: 7.0 standard drinks of alcohol  Types: 7 Standard drinks or equivalent per week  Comment: a drink with dinner  Drug use: No  Sexual activity: Yes  Partners: Female      REVIEW OF SYSTEMS:  GENERAL HEALTH: feels well otherwise, denies fever  SKIN: denies any unusual skin lesions or rashes  EYES: no visual complaints or deficits  HEENT: denies nasal congestion, sinus pain or sore throat; hearing loss negative  RESPIRATORY: denies shortness of breath,  wheezing or cough  CARDIOVASCULAR: denies chest pain or CAMERON; no palpitations  GI: denies nausea, vomiting, constipation, diarrhea; no rectal bleeding; no heartburn  GENITAL/: no dysuria, urgency or frequency; no hernias; no nocturia; no genital complains  MUSCULOSKELETAL: no joint complaints upper or lower extremities  NEURO: no sensory or motor complaint  PSYCHE: no symptoms of depression or anxiety  HEMATOLOGY: denies h/o anemia; denies bruising or excessive bleeding  ENDOCRINE: denies excessive thirst or urination; denies unexpected wt gain or wt loss  ALLERGY/IMM.: denies food or seasonal allergies    Immunization History  Administered Date(s) Administered  Covid-19 Vaccine Moderna 100 mcg/0.5 ml  02/11/2021 03/08/2021 03/11/2021  10/22/2021 04/04/2022  Covid-19 Vaccine Moderna 50 Mcg/0.5 ml (Spikevax) 12+  09/29/2023  Covid-19 Vaccine Moderna Bivalent 50 mcg/0.5 ml  10/12/2022  DTAP 04/28/2018  FLU VAC High Dose 65 YRS & Older PRSV Free (53411)  10/17/2017 09/17/2021 09/29/2023  FLUZONE 6 months and older PFS 0.5 ml (00671)  09/03/2020  Fluad 0.5 ml Quad PRSV Free 65+ (74670)  09/26/2022  Fluad 0.5 ml SDV 65yr+ (99323)  10/16/2018 10/17/2018  Fluzone Vaccine Medicare ()  12/11/2013 10/21/2015 11/04/2015  10/17/2017 10/03/2020  HIGH DOSE FLU 65 YRS AND OLDER PRSV FREE SINGLE D (35840) FLU CLINIC  10/22/2015 12/13/2016 10/07/2019  Hep A, Adult 09/29/2014 08/11/2015  Influenza 12/17/2007 01/22/2009 03/15/2010  10/08/2010 10/01/2012 10/03/2014  Influenza Virus Vaccine, Split  10/17/2011  Pneumococcal (Prevnar 13)  12/10/2014  Pneumovax 23 10/17/2011  RSV 09/29/2023  TDAP 12/17/2007 08/23/2017 08/12/2018  TYPHOID 09/29/2014 09/07/2017 09/09/2017  Typhoid,VI Polysacharide IM  09/29/2014 09/09/2017  Zoster Vaccine Live (Zostavax)  03/15/2010  Zoster Vaccine Recombinant Adjuvanted (Shingrix)  08/12/2018 10/17/2018 12/17/2018    EXAM:  /70  Pulse 80  Temp 97.9 °F (36.6 °C) (Oral)  Ht 5' 11\" (1.803 m)   Wt 191 lb 3.2 oz (86.7 kg)  SpO2 96%  BMI 26.67 kg/m²  GENERAL: well developed, well nourished, in no apparent distress  SKIN: no rashes, no suspicious lesions  HEENT: normocephalic; normal nose, pharynx and TM's  EYES: PERRLA, EOMI, sclera anicteric, conjunctiva normal; fundi normal  NECK: supple, FROM, no nodes, no JVD, no thyromegaly, no carotid bruits  BREAST: no masses, no nipple discharge, no nodes; normal skin  RESPIRATORY: clear to percussion and auscultation  CARDIOVASCULAR: S1, S2 normal, RRR; no S3, no S4; no click; murmur negative  ABDOMEN: normal active BS+, soft, nondistended; no HSM; no masses; no bruits; no masses; nontender  LYMPHATIC: no lymphadenopathy  MUSCULOSKELETAL: no acute synovitis upper or lower extremity  EXTREMITIES: no cyanosis, clubbing or edema, peripheral pulses intact  NEUROLOGIC: CN 2-12 intact; no sensorimotor deficit; reflexes normal  PSYCHIATRIC: alert and oriented x 3; affect appropriate    ASSESSMENT & PLAN:  Preoperative Physical Exam:    Higinio was examined today and is an acceptable risk to proceed with surgery.    Will obtain and evaluate preoperative labs and ECG.    Risks and benefits of surgery explained to patient. Including; Deep venous thrombosis, pulmonary embolus, bleeding, myocardial infarction, failure to relieve pain, wound infection, wound dehiscence, anesthesia complications, arrhythmia's etc.    Handout given regarding diagnosis and medication prescribed. Reviewed data with pt and pt verbalized understanding    PMH, PSH, SOCH AND FAMH reviewed.    CC: to referring physician Lombardi.  id#[id number]    Electronically signed by Madalyn Chamberlain MD at 09/05/2024 12:16 PM CDT

## 2024-09-19 NOTE — INTERVAL H&P NOTE
Pre-op Diagnosis: OSTEOARTHRITIS RIGHT HIP    The above referenced H&P was reviewed by John A Lombardi, MD on 9/19/2024, the patient was examined and no significant changes have occurred in the patient's condition since the H&P was performed.  I discussed with the patient and/or legal representative the potential benefits, risks and side effects of this procedure; the likelihood of the patient achieving goals; and potential problems that might occur during recuperation.  I discussed reasonable alternatives to the procedure, including risks, benefits and side effects related to the alternatives and risks related to not receiving this procedure.  We will proceed with procedure as planned.

## 2024-09-20 VITALS
BODY MASS INDEX: 26.9 KG/M2 | RESPIRATION RATE: 22 BRPM | OXYGEN SATURATION: 93 % | DIASTOLIC BLOOD PRESSURE: 67 MMHG | WEIGHT: 192.13 LBS | HEART RATE: 57 BPM | SYSTOLIC BLOOD PRESSURE: 138 MMHG | HEIGHT: 71 IN | TEMPERATURE: 98 F

## 2024-09-20 LAB
HCT VFR BLD AUTO: 32 %
HGB BLD-MCNC: 11.3 G/DL

## 2024-09-20 PROCEDURE — 97530 THERAPEUTIC ACTIVITIES: CPT

## 2024-09-20 PROCEDURE — 97116 GAIT TRAINING THERAPY: CPT

## 2024-09-20 PROCEDURE — 85018 HEMOGLOBIN: CPT | Performed by: NURSE PRACTITIONER

## 2024-09-20 PROCEDURE — 97110 THERAPEUTIC EXERCISES: CPT

## 2024-09-20 PROCEDURE — 85014 HEMATOCRIT: CPT | Performed by: NURSE PRACTITIONER

## 2024-09-20 PROCEDURE — 97535 SELF CARE MNGMENT TRAINING: CPT

## 2024-09-20 PROCEDURE — 97165 OT EVAL LOW COMPLEX 30 MIN: CPT

## 2024-09-20 NOTE — PROGRESS NOTES
AVS reviewed, IV dc'd, will dc home w/ [facility], discharge video viewed, verbalized understanding of dc instructions.

## 2024-09-20 NOTE — CONSULTS
General Medicine Consult      Reason for consult: post-op medical management right RUTH ANN    Consulted by: Dr. lombardi    PCP: Madalyn Chamberlain MD      History of Present Illness: Patient is a 79 year old male with PMH sig for prostate cancer, hypertension, hyperlipidemia, obstructive sleep apnea, CKD stage IIIa, presented for elective right RUTH ANN. Prior to procedure pt reports being in usual state of health with no recent fever, chills, CP, or SOB.  Pt currently reports 0/10 pain, worse with movement, relieved with pain medications. Denies nausea. Tolerating diet. No current complaints.    PMH:  Past Medical History:    Bladder cancer (HCC)    CKD (chronic kidney disease) stage 2, GFR 60-89 ml/min    Exposure to medical diagnostic radiation    prostate    High blood pressure    High cholesterol    Mixed hyperlipidemia    Nontraumatic complete tear of left rotator cuff    Osteoarthritis    left hip    Prostate cancer (HCC)    Renal disorder    Skin cancer (melanoma) (HCC)    Syncopal episodes    x 1     Unspecified essential hypertension    Visual impairment    glasses    Vitamin D deficiency        PSH:  Past Surgical History:   Procedure Laterality Date    Colonoscopy  2005    Colonoscopy  05/01/2014    Procedure: COLONOSCOPY;  Surgeon: Shiva Angel MD;  Location:  ENDOSCOPY    Colonoscopy N/A 10/23/2017    Procedure: COLONOSCOPY;  Surgeon: Shiva Angel MD;  Location:  ENDOSCOPY    Hernia surgery      Hip replacement surgery  06/26/2018    left     Melanoma monitor profile      Other surgical history  2010    Rt shoulder RCR    Other surgical history  2009    bilateral knee arthroscopies        HOME MEDS  Outpatient Medications Marked as Taking for the 9/19/24 encounter (Hospital Encounter)   Medication Sig Dispense Refill    acetaminophen-codeine 300-30 MG Oral Tab Take 1 tablet by mouth every 4 (four) hours as needed.      celecoxib 200 MG Oral Cap Take 1 capsule (200 mg total) by mouth daily.      Ferrous  Sulfate 325 (65 Fe) MG Oral Tab Take 1 tablet (325 mg total) by mouth daily.      docusate sodium 100 MG Oral Cap Take 100 mg by mouth 2 (two) times daily.      aspirin 325 MG Oral Tab Take 1 tablet (325 mg total) by mouth 2 (two) times daily.      famotidine 20 MG Oral Tab Take 1 tablet (20 mg total) by mouth 2 (two) times daily.      alfuzosin ER 10 MG Oral Tablet 24 Hr Take 1 tablet (10 mg total) by mouth daily.      relugolix (ORGOVYX) 120 MG Oral Tab Take 1 tablet (120 mg total) by mouth daily.      IRBESARTAN 300 MG Oral Tab TAKE 1 TABLET (300 MG TOTAL) BY MOUTH DAILY. 90 tablet 1    AMLODIPINE 10 MG Oral Tab TAKE 1 TABLET EVERY DAY 90 tablet 1    CHLORTHALIDONE 25 MG Oral Tab TAKE 1 TABLET (25 MG TOTAL) BY MOUTH DAILY. 90 tablet 3    rosuvastatin 20 MG Oral Tab Take 1 tablet (20 mg total) by mouth nightly. 90 tablet 1    acetaminophen 500 MG Oral Tab Take 1 tablet (500 mg total) by mouth every 6 (six) hours as needed for Pain.      Cholecalciferol (VITAMIN D) 1000 UNITS Oral Cap Take 1 tablet by mouth daily.      MULTIVITAMINS OR Take 1 tablet by mouth daily.         Scheduled Medication:    Continuous Infusing Medication:    PRN Medication:       ALL:  Allergies   Allergen Reactions    Norco [Hydrocodone-Acetaminophen] RASH     Pt broke out in small rash        Soc Hx:  Social History     Tobacco Use    Smoking status: Former     Current packs/day: 0.00     Average packs/day: 1 pack/day for 20.0 years (20.0 ttl pk-yrs)     Types: Cigarettes     Start date: 1965     Quit date: 1985     Years since quittin.7    Smokeless tobacco: Former   Substance Use Topics    Alcohol use: Yes     Alcohol/week: 7.0 standard drinks of alcohol     Types: 7 Standard drinks or equivalent per week        Fam Hx  Family History   Problem Relation Age of Onset    Diabetes Father     Heart Disorder Father     Hypertension Father     Lipids Father     Lipids Mother     Heart Disorder Mother         mi@39       Review of  Systems  Comprehensive ROS reviewed and negative except for what's stated above.  Including negative for chest pain, shortness of breath, syncope.       OBJECTIVE:  /67 (BP Location: Left arm)   Pulse 57   Temp 98.3 °F (36.8 °C) (Oral)   Resp 22   Ht 5' 11\" (1.803 m)   Wt 192 lb 1.6 oz (87.1 kg)   SpO2 93%   BMI 26.79 kg/m²     Wt Readings from Last 6 Encounters:   09/19/24 192 lb 1.6 oz (87.1 kg)   04/02/22 185 lb (83.9 kg)   03/23/22 188 lb (85.3 kg)   02/28/22 188 lb (85.3 kg)   08/30/21 185 lb (83.9 kg)   02/15/21 190 lb (86.2 kg)       General: alert and oriented, NAD  HEENT: normocephalic, MMM, no oropharyngeal lesions  Lungs: CTA, good effort  CV: nl S1/S2  GI: soft/NT/ND +BS  Ext: nonedematous b/l LE right with appropriate post-op dressing in place on right LE  Neuro: moving all extremities  Psych: normal mood, normal affect  Skin: no rashes, no lesions, with exception of post-op dressing in place/c/d/i      Diagnostics:   CBC/Chem  Recent Labs   Lab 09/20/24  0527   HGB 11.3*       No results for input(s): \"NA\", \"K\", \"CL\", \"CO2\", \"BUN\", \"CREATSERUM\", \"GLU\", \"CA\", \"CAION\", \"MG\", \"PHOS\" in the last 168 hours.    No results for input(s): \"ALT\", \"AST\", \"ALB\", \"AMYLASE\", \"LIPASE\", \"LDH\" in the last 168 hours.    Invalid input(s): \"ALPHOS\", \"TBIL\", \"DBIL\", \"TPROT\"        Radiology: XR FLUOROSCOPY C-ARM TIME LESS THAN 1 HOUR (CPT=76000)    Result Date: 9/19/2024  PROCEDURE:  XR FLUOROSCOPY C-ARM TIME <1 HOUR  (CPT=76000)  INDICATIONS:  Right hip arthroplasty  COMPARISON:  [facility] , XR, XR ASPIR/INJ MAJOR JOINT W/FLUORO LT(CPT=77002/94622), 5/21/2018, 8:42 AM.   TECHNIQUE:  Intraoperative fluoroscopic guidance  FLUOROSCOPY IMAGES OBTAINED:  6 FLUOROSCOPY TIME:  21 seconds TECHNOLOGIST TIME:  1 hour 30 minutes RADIATION DOSE (AIR KERMA PRODUCT):  1.2762mGy   FINDINGS:  Fluoroscopic guidance during right total hip arthroplasty            CONCLUSION:  Fluoroscopic guidance during total right hip  arthroplasty  LOCATION:  [facility]    Dictated by (CST): Norm Amezcua MD on 9/19/2024 at 12:49 PM     Finalized by (CST): Norm Amezcua MD on 9/19/2024 at 12:49 PM       XR HIP W OR WO PELVIS 1 VIEW, RIGHT (CPT=73501)    Result Date: 9/19/2024  PROCEDURE:  XR HIP W OR WO PELVIS 1 VIEW, RIGHT (CPT=73501)  TECHNIQUE:  Unilateral view of the hip.  COMPARISON:  None.  INDICATIONS:  Right hip replacement  PATIENT STATED HISTORY: (As transcribed by Technologist)  Post op image.    FINDINGS:  Postoperative changes of right hip arthroplasty with hardware in good alignment. Air within the subcutaneous soft tissues. Surgical staples along the lateral soft tissues. No acute fractures.  Vascular calcifications.            CONCLUSION:  Postoperative changes of right hip arthroplasty with hardware in good alignment.   LOCATION:  [facility]   Dictated by (CST): Mustapha Ny MD on 9/19/2024 at 12:17 PM     Finalized by (CST): Mustapha Ny MD on 9/19/2024 at 12:18 PM            ASSESSMENT / PLAN:    # s/p right RUTH ANN  -Postop antibiotics, fluids, DVT prophylaxis per Ortho spine  -Tolerating ambulation,  -Aspirin twice daily for DVT prophylaxis  - PT/OT    # Post-op pain   - Transition to PO pain medications  - Bowel regimen    # Hypertension  -Resume home medications including amlodipine and ARB    # History of prostate cancer    # CKD stage IIIa  -Follow-up outpatient creatinine    # Hyperlipidemia statin  -  Outpatient records or previous hospital records reviewed.     Appreciate this consultation. [facility] hospitalist to continue to follow patient while in house. Please call with any questions or concerns.     Tayla rodriguez MD   Internal Medicine  [facility] Hospitalist  Pager: [phone]

## 2024-09-20 NOTE — PHYSICAL THERAPY NOTE
PHYSICAL THERAPY TREATMENT NOTE - INPATIENT    Room Number: 377/377-A     Session: 1     Number of Visits to Meet Established Goals: 2    Presenting Problem: s/p R ant RUTH ANN  Co-Morbidities : Mixed HLD, CKD3, GERD, s/p L RUTH ANN 2018, PVD  ,  prostate cancer    ASSESSMENT   Patient demonstrates good  progress this session, goals updated to reflect patient performance.    Patient continues to function below baseline with bed mobility, transfers, gait, and stair negotiation. Contributing factors to remaining limitations include decreased functional strength and decreased muscular endurance.  Next session anticipate patient to progress gait and stair negotiation.  Physical Therapy will continue to follow patient for duration of hospitalization.    Patient continues to benefit from continued skilled PT services: at discharge to promote prior level of function and safety with additional support and return home with home health PT.    PLAN  PT Treatment Plan: Bed mobility;Body mechanics;Coordination;Endurance;Energy conservation;Patient education;Family education;Gait training;Neuromuscular re-educate;Range of motion;Strengthening;Stoop training;Stair training;Transfer training;Balance training  Rehab Potential : Good  Frequency (Obs): Daily    CURRENT GOALS       Goal #1  Patient is able to demonstrate supine - sit EOB @ level: supervision      Goal #2  Patient is able to demonstrate transfers Sit to/from Stand at assistance level: supervision   Goal #3     Patient is able to ambulate 150 feet with assistive device at assistance level: supervision  met   Goal #4     Patient will negotiate 4 stairs/one curb w/ assistive device and supervision   Goal #5     Patient verbalizes and/or demonstrates all precautions and safety concerns independently   Progressing    Goal #6        Goal Comments: Goals established on 9/19/2024 9/20/2024 all goals ongoing     SUBJECTIVE  \"It sounds like she is going to teach me how to type\" referring  to OT     OBJECTIVE  Precautions: RUTH ANN - anterior;Bed/chair alarm (+ NO ACTIVE HIP ABD)    WEIGHT BEARING RESTRICTION  Weight Bearing Restriction: R lower extremity        R Lower Extremity: Weight Bearing as Tolerated       PAIN ASSESSMENT   Ratin  Location: R hip  Management Techniques: Activity promotion;Body mechanics;Breathing techniques;Relaxation;Repositioning    BALANCE                                                                                                                       Static Sitting: Good  Dynamic Sitting: Fair +           Static Standing: Fair -  Dynamic Standing: Fair -    ACTIVITY TOLERANCE                         O2 WALK         AM-PAC '6-Clicks' INPATIENT SHORT FORM - BASIC MOBILITY  How much difficulty does the patient currently have...  Patient Difficulty: Turning over in bed (including adjusting bedclothes, sheets and blankets)?: None   Patient Difficulty: Sitting down on and standing up from a chair with arms (e.g., wheelchair, bedside commode, etc.): A Little   Patient Difficulty: Moving from lying on back to sitting on the side of the bed?: A Little   How much help from another person does the patient currently need...   Help from Another: Moving to and from a bed to a chair (including a wheelchair)?: None   Help from Another: Need to walk in hospital room?: A Little   Help from Another: Climbing 3-5 steps with a railing?: A Little       AM-PAC Score:  Raw Score: 20   Approx Degree of Impairment: 35.83%   Standardized Score (AM-PAC Scale): 47.67   CMS Modifier (G-Code): CJ    FUNCTIONAL ABILITY STATUS  Gait Assessment   Functional Mobility/Gait Assessment  Gait Assistance: Supervision  Distance (ft): 150x2  Assistive Device: Rolling walker  Pattern: R Decreased stance time  Stairs: Stairs;Car transfer  How Many Stairs: 4  Device: 1 Rail  Assist: Contact guard assist  Pattern: Ascend and Descend  Ascend and Descend : Side step  Car transfer: CGA    Skilled Therapy Provided  Pt  presents in semi sup  Therapist educated pt on hip precautions and implementations into functional mobility   Pt demos bed mobility from flat bed to simulate home setting   Pt gait trained and t/f trained as noted c cues for sequencing   Reviewed RUTH ANN HEP   Bed Mobility:  Rolling: nt    Supine<>Sit: supervision    Sit<>Supine: nt      Transfer Mobility:  Sit<>Stand: CGA cues for hand placement    Stand<>Sit: CGA    Gait: CGA to supervision c RW     Therapist's Comments: pt has RW for home use       THERAPEUTIC EXERCISES  Lower Extremity Heel raises  Knee extension  LAQ  Standing rocks, standing knee flexion      Upper Extremity Scapular Retraction     Position Sitting & Standing     Repetitions   10   Sets   1     Patient End of Session: Up in chair;Needs met;Call light within reach;RN aware of session/findings;All patient questions and concerns addressed;Alarm set;Family present    PT Session Time: 38 minutes  Gait Training: 15 minutes  Therapeutic Activity: 13 minutes  Therapeutic Exercise: 10 minutes   Neuromuscular Re-education:  minutes

## 2024-09-20 NOTE — PLAN OF CARE
A&O x4. VSS on RA. . Pain well controlled with PO medication. Up with min assist with gb and walker. Voids freely via urinal. Bilateral SCDs. Aquacel dressing C/D/I. Gel ice. Ancef post op. Reviewed POC, pain management, IS use, and fall precautions with pt. Bed alarm on w/bed in lowest position. Pt reminded to use call light. Verbalized understanding. PT to see again, OT to see. Pre-op with [facility].

## 2024-09-20 NOTE — PROGRESS NOTES
[facility]  Orthopedic Surgery  Progress Note    Higinio Perea Patient Status:  Outpatient in a Bed    1945 MRN [id number]   [facility] 3SW-A Attending Lombardi, John A, MD   Hosp Day # 0 PCP Madalyn Chamberlain MD     SUBJECTIVE:  INTERVAL HISTORY:  0 S/P Procedure(s):  RIGHT ANTERIOR TOTAL HIP ARTHROPLASTY.  Patient has no current complaints. Patient denies chest pain and shortness of breath.    OBJECTIVE:  Patient Vitals for the past 24 hrs:   BP Temp Temp src Pulse Resp SpO2 Weight   24 0408 128/67 98.1 °F (36.7 °C) Oral 54 16 93 % --   24 2342 154/66 97.7 °F (36.5 °C) Oral 52 18 95 % --   24 1932 151/68 97.7 °F (36.5 °C) Oral (!) 46 18 -- --   24 1615 139/62 97.6 °F (36.4 °C) Oral (!) 44 20 95 % --   24 1311 137/61 98.3 °F (36.8 °C) Oral (!) 44 20 97 % --   24 1300 -- -- -- -- -- 95 % --   24 1255 -- -- -- (!) 47 14 97 % --   24 1250 -- -- -- (!) 45 13 96 % --   24 1245 -- -- -- (!) 48 13 98 % --   24 1240 -- -- -- (!) 48 13 97 % --   24 1235 134/63 -- -- (!) 49 20 97 % --   24 1230 -- -- -- (!) 46 15 97 % --   24 1225 -- -- -- 52 17 96 % --   24 1220 -- -- -- (!) 45 12 97 % --   24 1215 140/69 -- -- 53 17 97 % --   24 1210 -- -- -- (!) 49 13 94 % --   24 1205 -- -- -- 52 12 96 % --   24 1200 138/70 -- -- 50 12 99 % --   24 1155 -- -- -- 51 15 98 % --   24 1150 -- -- -- 60 17 97 % --   24 1145 97/52 -- -- 53 11 97 % --   24 1140 -- -- -- 54 14 96 % --   24 1135 -- -- -- 52 16 96 % --   24 1130 134/73 -- -- 75 19 96 % --   24 1125 -- -- -- 58 18 98 % --   24 1120 -- 98.1 °F (36.7 °C) -- 59 22 97 % --   24 1115 149/68 -- -- 55 16 98 % --   24 1110 -- -- -- 57 17 96 % --   24 1105 -- -- -- 51 14 95 % --   24 1100 139/62 -- -- 52 14 97 % --   24 1055 130/71 -- -- 67 23 96 % --   24 1050 131/64  -- -- 52 18 99 % --   09/19/24 1045 130/55 -- -- 56 17 98 % --   09/19/24 1041 117/55 98.3 °F (36.8 °C) Temporal 52 16 99 % --   09/19/24 0736 149/75 97.8 °F (36.6 °C) Temporal 54 16 100 % 192 lb 1.6 oz (87.1 kg)       ORTHO EXAM:  Right lower extremity:  Neurovascular intact, mild/ 1+/2+ edema.  Thigh soft, non-tender, no other signs of DVT.  Moves foot and toes without pain.  Aquacel dressing dry and intact with less than 50% saturation, no erythema present.    PAIN: mild controlled well with oral medication.      LABORATORY:  Recent Labs   Lab 09/20/24  0527   HGB 11.3*   HCT 32.0*      No results for input(s): \"PTP\", \"INR\" in the last 168 hours.    DIAGNOSTICS:       ASSESSMENT/PLAN:  Continue pain management  Continue DVT prophylaxis   Continue PT/OT  Discharge planning: HOME WITH HOME HEALTH  Continue medical management  Follow up with Lombardi, John A, MD in 2 weeks      John A Lombardi, MD  9/20/2024  7:18 AM

## 2024-09-20 NOTE — OCCUPATIONAL THERAPY NOTE
OCCUPATIONAL THERAPY EVALUATION - INPATIENT    Room Number: 377/377-A  Evaluation Date: 9/20/2024     Type of Evaluation: Initial  Presenting Problem: s/p R ant RUTH ANN    Physician Order: IP Consult to Occupational Therapy  Reason for Therapy:  ADL/IADL Dysfunction and Discharge Planning      OCCUPATIONAL THERAPY ASSESSMENT   Patient is a 79 year old male admitted on 9/19/2024 from home for planned surgery. Pt is s/p R anterior RUTH ANN.  Co-Morbidities : Mixed HLD, CKD3, GERD, s/p L RUTH ANN 2018, PVD  ,  prostate cancer      Patient participated well in eval, able to complete ADLs and functional mobility/transfers with RW and Supervision. All necessary education completed and Pt verbalized/demo'd understanding as appropriate. No further questions/concerns voiced. Has all necessary AE/DME. Spouse will be present to assist as needed. No OT needs anticipated at DC.     WEIGHT BEARING RESTRICTION  Weight Bearing Restriction: R lower extremity        R Lower Extremity: Weight Bearing as Tolerated       Recommendations for nursing staff:   Transfers: SBA with RW  Toileting location: Toilet    EVALUATION SESSION:  Patient at start of session: seated in chair   FUNCTIONAL TRANSFER ASSESSMENT  Sit to Stand: Chair  Chair: Modified Independent  Toilet Transfer: Supervision    BED MOBILITY     BALANCE ASSESSMENT  Static Sitting: Independent  Static Standing: Stand-by Assist    FUNCTIONAL ADL ASSESSMENT  UB Dressing Standing: Supervision  LB Dressing Seated: Supervision  LB Dressing Standing: Supervision  Toileting Seated: Supervision  Toileting Standing: Supervision      ACTIVITY TOLERANCE: Pt on room air and denies SOB, dizziness or lightheadedness throughout session. No significant change in vitals noted.     COGNITION  Overall Cognitive Status:  WFL - within functional limits    EDUCATION PROVIDED  Patient: Role of Occupational Therapy; Plan of Care; Adaptive Equipment Recommendations; DME Recommendations; Functional Transfer  Techniques; Fall Prevention; Weight Bear Status; Surgical Precautions; Posture/Positioning; Compensatory ADL Techniques; Proper Body Mechanics  Patient's Response to Education: Verbalized Understanding; Returned Demonstration  Family/Caregiver: Role of Occupational Therapy; Plan of Care; Adaptive Equipment Recommendations; DME Recommendations; Functional Transfer Techniques; Fall Prevention; Weight Bear Status; Surgical Precautions; Posture/Positioning; Compensatory ADL Techniques; Proper Body Mechanics  Family/Caregiver's Response to Education: Verbalized Understanding    Equipment used: RW, reacher, sock-aid   Demonstrates functional use    Therapist comments: Pt is pleasant and motivated to participate in therapy.  Supportive spouse present and included in education. Pt familiar with LHAE from previous surgeries and demonstrates use. Discussed safety with toileting, especially frequent nighttimes toileting with urgency. Pt and spouse agreeable to all recommendations.     Patient End of Session: Up in chair;Needs met;RN aware of session/findings;Call light within reach;All patient questions and concerns addressed;Family present;Alarm set    OCCUPATIONAL PROFILE    HOME SITUATION  Type of Home: House  Home Layout: One level  Lives With: Spouse    Toilet and Equipment: Standard height toilet (toilet safety frame)  Shower/Tub and Equipment: Walk-in shower;Shower chair  Other Equipment: Hip kit (RW)    Occupation/Status: retired     Drives: Yes  Patient Regularly Uses: Reading glasses    Prior Level of Function: Pt lives with his wife in one level home. Pt is typically independent with Adls and mobility without device.     SUBJECTIVE  \"He's done my rotator cuff, my knees, and other hip.\" Re: surgeon     PAIN ASSESSMENT  Ratin  Location: RLE  Management Techniques: Activity promotion;Body mechanics;Breathing techniques;Ice    OBJECTIVE  Precautions: RUTH ANN - anterior;Bed/chair alarm (+ NO ACTIVE HIP ABD)  Fall Risk:  Standard fall risk    WEIGHT BEARING RESTRICTION  Weight Bearing Restriction: R lower extremity        R Lower Extremity: Weight Bearing as Tolerated       AM-PAC ‘6-Clicks’ Inpatient Daily Activity Short Form  -   Putting on and taking off regular lower body clothing?: A Little  -   Bathing (including washing, rinsing, drying)?: A Little  -   Toileting, which includes using toilet, bedpan or urinal? : None  -   Putting on and taking off regular upper body clothing?: None  -   Taking care of personal grooming such as brushing teeth?: None  -   Eating meals?: None    AM-PAC Score:  Score: 22  Approx Degree of Impairment: 25.8%  Standardized Score (AM-PAC Scale): 47.1      ADDITIONAL TESTS     NEUROLOGICAL FINDINGS        PLAN   Patient has been evaluated and presents with no skilled Occupational Therapy needs at this time.  Patient discharged from Occupational Therapy services.  Please re-order if a new functional limitation presents during this admission.      Patient Evaluation Complexity Level:   Occupational Profile/Medical History LOW - Brief history including review of medical or therapy records    Specific performance deficits impacting engagement in ADL/IADL LOW  1 - 3 performance deficits    Client Assessment/Performance Deficits MODERATE - Comorbidities and min to mod modifications of tasks    Clinical Decision Making LOW - Analysis of occupational profile, problem-focused assessments, limited treatment options    Overall Complexity LOW     OT Session Time: 25 minutes  Self-Care Home Management: 15 minutes

## 2024-10-23 ENCOUNTER — APPOINTMENT (OUTPATIENT)
Dept: PHYSICAL THERAPY | Age: 79
End: 2024-10-23
Attending: ORTHOPAEDIC SURGERY
Payer: MEDICARE

## 2024-10-30 ENCOUNTER — APPOINTMENT (OUTPATIENT)
Dept: PHYSICAL THERAPY | Age: 79
End: 2024-10-30
Attending: ORTHOPAEDIC SURGERY
Payer: MEDICARE

## 2024-11-01 ENCOUNTER — APPOINTMENT (OUTPATIENT)
Dept: PHYSICAL THERAPY | Age: 79
End: 2024-11-01
Attending: ORTHOPAEDIC SURGERY
Payer: MEDICARE

## 2024-11-05 ENCOUNTER — APPOINTMENT (OUTPATIENT)
Dept: PHYSICAL THERAPY | Age: 79
End: 2024-11-05
Attending: ORTHOPAEDIC SURGERY
Payer: MEDICARE

## 2024-11-07 ENCOUNTER — APPOINTMENT (OUTPATIENT)
Dept: PHYSICAL THERAPY | Age: 79
End: 2024-11-07
Attending: ORTHOPAEDIC SURGERY
Payer: MEDICARE

## 2024-11-12 ENCOUNTER — APPOINTMENT (OUTPATIENT)
Dept: PHYSICAL THERAPY | Age: 79
End: 2024-11-12
Attending: ORTHOPAEDIC SURGERY
Payer: MEDICARE

## 2024-11-14 ENCOUNTER — APPOINTMENT (OUTPATIENT)
Dept: PHYSICAL THERAPY | Age: 79
End: 2024-11-14
Attending: ORTHOPAEDIC SURGERY
Payer: MEDICARE

## 2024-11-19 ENCOUNTER — APPOINTMENT (OUTPATIENT)
Dept: PHYSICAL THERAPY | Age: 79
End: 2024-11-19
Attending: ORTHOPAEDIC SURGERY
Payer: MEDICARE

## 2024-11-21 ENCOUNTER — APPOINTMENT (OUTPATIENT)
Dept: PHYSICAL THERAPY | Age: 79
End: 2024-11-21
Attending: ORTHOPAEDIC SURGERY
Payer: MEDICARE

## 2024-11-26 ENCOUNTER — APPOINTMENT (OUTPATIENT)
Dept: PHYSICAL THERAPY | Age: 79
End: 2024-11-26
Attending: ORTHOPAEDIC SURGERY
Payer: MEDICARE

## 2024-11-29 ENCOUNTER — APPOINTMENT (OUTPATIENT)
Dept: PHYSICAL THERAPY | Age: 79
End: 2024-11-29
Attending: ORTHOPAEDIC SURGERY
Payer: MEDICARE

## 2024-12-04 ENCOUNTER — APPOINTMENT (OUTPATIENT)
Dept: PHYSICAL THERAPY | Age: 79
End: 2024-12-04
Attending: ORTHOPAEDIC SURGERY
Payer: MEDICARE

## (undated) DEVICE — 1016 S-DRAPE IRRIG POUCH 10/BOX: Brand: STERI-DRAPE™

## (undated) DEVICE — 450 ML BOTTLE OF 0.05% CHLORHEXIDINE GLUCONATE IN 99.95% STERILE WATER FOR IRRIGATION, USP AND APPLICATOR.: Brand: IRRISEPT ANTIMICROBIAL WOUND LAVAGE

## (undated) DEVICE — SOLUTION IRRIG 3000ML 0.9% NACL FLX CONT

## (undated) DEVICE — 3M™ COBAN™ NL STERILE NON-LATEX SELF-ADHERENT WRAP, 2084S, 4 IN X 5 YD (10 CM X 4,5 M), 18 ROLLS/CASE: Brand: 3M™ COBAN™

## (undated) DEVICE — ANTERIOR HIP: Brand: MEDLINE INDUSTRIES, INC.

## (undated) DEVICE — SUTURE VICRYL 2-0 CT-1

## (undated) DEVICE — HOOD, PEEL-AWAY: Brand: FLYTE

## (undated) DEVICE — 3M™ STERI-STRIP™ REINFORCED ADHESIVE SKIN CLOSURES, R1547, 1/2 IN X 4 IN (12 MM X 100 MM), 6 STRIPS/ENVELOPE: Brand: 3M™ STERI-STRIP™

## (undated) DEVICE — STERILE POLYISOPRENE POWDER-FREE SURGICAL GLOVES: Brand: PROTEXIS

## (undated) DEVICE — GAUZE - RF AND X-RAY DETECTABLE USP TYPE VII, 16 PLY: Brand: SITUATE

## (undated) DEVICE — GOWN,SIRUS,FABRIC-REINFORCED,X-LARGE: Brand: MEDLINE

## (undated) DEVICE — SYRINGE MED 30ML STD CLR PLAS LL TIP N CTRL

## (undated) DEVICE — STRYKER PERFORMANCE SERIES SAGITTAL BLADE: Brand: STRYKER PERFORMANCE SERIES

## (undated) DEVICE — 3M™ IOBAN™ 2 ANTIMICROBIAL INCISE DRAPE 6650EZ: Brand: IOBAN™ 2

## (undated) DEVICE — Device: Brand: DEFENDO AIR/WATER/SUCTION AND BIOPSY VALVE

## (undated) DEVICE — GLOVE SUR 8 SENSICARE PI PIP CRM PWD F

## (undated) DEVICE — PILLOW ABDUCTION HIP SM

## (undated) DEVICE — FORCEP BIOPSY RJ4 LG CAP W/ND

## (undated) DEVICE — 3M™ MICROFOAM™ TAPE 1528-4: Brand: 3M™ MICROFOAM™

## (undated) DEVICE — GLOVE SURG TRIUMPH SZ 8-1/2

## (undated) DEVICE — DRAPE,U/SHT,SPLIT,FILM,60X84,STERILE: Brand: MEDLINE

## (undated) DEVICE — SPECIMEN CONTAINER,POSITIVE SEAL INDICATOR, OR PACKAGED: Brand: PRECISION

## (undated) DEVICE — SUT STRATAFIX 2-0 9IN SH ABSRB VLT 26MM 1/2 C

## (undated) DEVICE — MLPD DISPOSABLE PAD (6' ROLL) 3 ROLLS: Brand: SCHAERER MEDICAL USA

## (undated) DEVICE — 3M™ RED DOT™ MONITORING ELECTRODE WITH FOAM TAPE AND STICKY GEL, 50/BAG, 20/CASE, 72/PLT 2570: Brand: RED DOT™

## (undated) DEVICE — BLADE ELECTRODE: Brand: EDGE

## (undated) DEVICE — KENDALL SCD EXPRESS SLEEVES, KNEE LENGTH, MEDIUM: Brand: KENDALL SCD

## (undated) DEVICE — SPONGE RAYTEC 4X4 RF DETECT

## (undated) DEVICE — COVER,LIGHT,CAMERA,HARD,1/PK,STRL: Brand: MEDLINE

## (undated) DEVICE — Device: Brand: STABLECUT®

## (undated) DEVICE — SUT MCRYL 3-0 27IN ABSRB UD 19MM PS-2 3/8

## (undated) DEVICE — TOTAL HIP CDS: Brand: MEDLINE INDUSTRIES, INC.

## (undated) DEVICE — SLEEVE COMPR MD KNEE LEN SGL USE KENDALL SCD

## (undated) DEVICE — GLOVE SUR 8 DERMASSURE PCP DK GRN PWD F

## (undated) DEVICE — ENDOSCOPY PACK - LOWER: Brand: MEDLINE INDUSTRIES, INC.

## (undated) DEVICE — ADHESIVE LIQ 2/3ML VI MASTISOL

## (undated) DEVICE — BIPOLAR SEALER 23-112-1 AQM 6.0: Brand: AQUAMANTYS™

## (undated) DEVICE — DECANTER BAG 9": Brand: MEDLINE INDUSTRIES, INC.

## (undated) DEVICE — 1200CC GUARDIAN II: Brand: GUARDIAN

## (undated) DEVICE — ELECTRODE PTFE BLADE 6': Brand: MEDLINE

## (undated) DEVICE — PIN STEINMAN SMOOTH 1/8 9

## (undated) DEVICE — NEEDLE SPNL 18GA L3.5IN PNK QNCKE STYL DISP

## (undated) DEVICE — PENCIL ES BTTN SWCH W/ TIP HOLSTER E-Z CLN

## (undated) DEVICE — VIOLET BRAIDED (POLYGLACTIN 910), SYNTHETIC ABSORBABLE SUTURE: Brand: COATED VICRYL

## (undated) DEVICE — SUT ORTHOCORD 2 VLT ABSRB OS-6 NDL PDS

## (undated) DEVICE — WRAP COOLING HIP W/ICE PILLOW

## (undated) DEVICE — SUT ETHBND XL 5 30IN V-37 NABSRB GRN 40MM 1/2

## (undated) DEVICE — Device

## (undated) DEVICE — DRESSING AQUACEL AG 3.5 X 10

## (undated) DEVICE — SUTURE FIBERWIRE 2 AR-7202

## (undated) DEVICE — SOL  .9 1000ML BAG

## (undated) DEVICE — GLOVE SUR 8.5 SENSICARE PI PIP CRM PWD F

## (undated) DEVICE — INSTRUMENT FEE

## (undated) DEVICE — WRAP HIP COMPR

## (undated) DEVICE — GLOVE ALOETOUCH ORTHO SZ 8

## (undated) DEVICE — FILTERLINE NASAL ADULT O2/CO2

## (undated) NOTE — LETTER
OUTSIDE TESTING RESULT REQUEST     IMPORTANT: FOR YOUR IMMEDIATE ATTENTION  Please FAX all test results listed below to: [phone]     Testing already done on or about: 9/3/24     * * * * If testing is NOT complete, arrange with patient A.S.A.P. * * * *      Patient Name: Higinio Perea  Surgery Date: 2024  Medical Record: [id number]  CSN: [id number]  : 1945 - A: 79 y     Sex: male  Surgeon(s):  Lombardi, John A, MD  Procedure: RIGHT ANTERIOR TOTAL HIP ARTHROPLASTY  Anesthesia Type: Spinal     Surgeon: Lombardi, John A, MD     The following Testing and Time Line are REQUIRED PER ANESTHESIA     EKG READ AND SIGNED WITHIN   90 days  CBC, Platelet; NO Differential within  90 days  BMP (requires 4 hour fast) within  60 days  MSSA/MRSA Nasal screening within 30 days      Thank You,   Sent by:Cyndi LAWLER

## (undated) NOTE — IP AVS SNAPSHOT
Patient Demographics     Address  [facility] [place] [address] Phone  [phone] (Home) *Preferred*  [phone] [facility]) E-mail Address  [email or web address]      Emergency Contact(s)     Name Relation Home Work Mobile    Brittany Perea Spouse ? For hip replacement surgery, follow instructions provided by physical therapy    No smoking  ? Avoid smoking. It is known to cause breathing problems and can decrease the rate of healing. Incision care/Dressing changes  ?  Wash hands before and after d ? Surgical discomfort is normal for one to two months. ? Have realistic goals and keep a positive outlook. ? Keep pain manageable; pain should not disrupt your sleep or activities like getting out of bed or walking.   ? You may need pain medication regula or are visiting. ? Keep bed linen/clothing freshly laundered. ? Do not allow others or pets to touch your incision. ? Avoid people that have colds or the flu. ?  Your surgeon may recommend that you take antibiotics before you undergo any dental or other ? Turning in or out of surgical leg that is new  ? Increased numbness, tingling to leg  ? Inability to walk or put weight on your surgical leg      Signs of blood clot  ?  Pain, excessive tenderness, redness, or swelling in leg or calf (other than incision SPECIAL  INSTRUCTIONS:  View Dr. Dixon Messina discharge education video at:  [email or web address]     Tubigrip (compression sleeve) care  ?  All patients should wear the compression sleeve until first follow up visit to [address] office (about 2-3 Sarah Cody MD In 1 week. Specialty:  Internal Medicine  Contact information:  [address] [place] [address] Melvina García MD In 2 weeks.     Specialty:  SURGERY, ORTHOPEDIC  Contact information:  37 HYDROcodone-acetaminophen  MG Tabs  Commonly known as:  NORCO      1-2 tabs every 4 hours, prn   Daniel Vazquez MD         Irbesartan 300 MG Tabs  Next dose due:  7/29/2018 9 am      Take 1 tablet (300 mg total) by mouth daily.    Nohemy Trivedi MD [id number] oxyCODONE HCl (OXY-IR) cap/tab 5 mg 07/27/18 2056 Given                    Recent Vital Signs    Flowsheet Row Most Recent Value   Vitals  132/59 Filed at 07/28/2018 0342   Pulse  60 Filed at 07/28/2018 0342   Resp  18 Filed at 07/28/2018 5651 RBC 3.68 3.80 - 5.80 x10(6)uL [facility]   HGB 11.8 13.0 - 17.0 g/dL [facility]   HCT 34.8 37.0 - 53.0 % [facility]   .0 150.0 - 450.0 10(3)uL — [facility]   MCV 94.6 80.0 - 99.0 fL — [facility]   MCH 32.1 27.0 - 33.2 pg — [facility]   MCHC 33 Mai Andrade, Desi Banuelos MD   Internal Medicine   Preoperative examination +5 more   Dx   Pre-Op Exam    Reason for Visit    Reason for Visit     Pre-Op Exam left hip replacement with Dr. Andreea Atkins at [facility] Comment: bilateral knee arthroscopies  Allergies: No Known Allergies     Current Outpatient Prescriptions: AmLODIPine Besylate 10 MG Oral Tab Take 1 tablet (10 mg total) by mouth daily.  Disp: 90 tablet Rfl: 3   ATORVASTATIN 80 MG Oral Tab TAKE 1 TABLE -     COMP METABOLIC PANEL (14);  Future  -     ELECTROCARDIOGRAM, COMPLETE  -     URINALYSIS WITH CULTURE REFLEX; Future  -     OFFICE/OUTPT VISIT,EST,LEVL IV     Essential hypertension  -     OFFICE/OUTPT VISIT,EST,LEVL IV  Stable on meds  Mixed hyperlipi • High cholesterol    • Mixed hyperlipidemia    • Osteoarthritis     left hip   • Skin cancer (melanoma) (HCC) 1980s   • Syncopal episodes 04/2018    x 1    • Unspecified essential hypertension    • Visual impairment     glasses   • Vitamin D deficiency 3/ daily. Disp: 90 tablet Rfl: 3   ATORVASTATIN 80 MG Oral Tab TAKE 1 TABLET EVERY NIGHT Disp: 90 tablet Rfl: 0   RANITIDINE  MG Oral Cap TAKE 1 CAPSULE TWICE DAILY Disp: 180 capsule Rfl: 1   valsartan 160 MG Oral Tab Take 1 tablet (160 mg total) by mo Patient is not on AV node blocking agents  -follow up with PCP  Check TSH    HL statin      Preventative Eliquis ordered    Ike Almonte MD  [facility]  [phone][ID.1]      Electronically signed by Saint Hausen, MD on 7/26/2018  9:35 PM   Attribu 2010: OTHER SURGICAL HISTORY      Comment: Rt shoulder RCR  2009: OTHER SURGICAL HISTORY      Comment: bilateral knee arthroscopies[AR.2]    SUBJECTIVE  \"I am ok suprisingly\"    Patient’s self-stated goal is - go home    OBJECTIVE[AR.1]  Precautions: RUTH ANN seated/standing exercises with assist for set-up and correct technique. Pt able to tolerate beginning BLE strengthening to help achieve greater strength/flexibility.   Pt able to achieve 300ft goal at supervision level with use of RW - cues for sequencing a At this time, Pt. presents with decreased balance, impaired strength, difficulty with gait/transfers resulting in downgrade of overall functional mobility.   Due to above deficits, Pt will benefit from continued IP PT, so that patient may achieve highest fu Evaluation Date: 7/26/2018  Type of Evaluation: Initial  Physician Order: PT Eval and Treat    Presenting Problem: L RUTH ANN 7/26/18  Reason for Therapy: Mobility Dysfunction and Discharge Planning    History related to current admission: Pt admitted from home Ratin  Location: L hip  Management Techniques: Activity promotion;Repositioning; Body mechanics    COGNITION  · Overall Cognitive Status:  WFL - within functional limits    RANGE OF MOTION AND STRENGTH ASSESSMENT  Upper extremity ROM and strength are wi Skilled Therapy Provided: Pt presents semi-reclined in bed, agreeable to PT eval. Spouse present. Pt educated on RUTH ANN anterior hip precautions with MD specific no active ABD. Pt instructed in WBAT status.  Pt completes gentle ROM to L hip with assist. Pt c negotiation. The patient is below baseline and would benefit from skilled inpatient PT to address the above deficits to assist patient in returning to prior to level of function.   DISCHARGE RECOMMENDATIONS  PT Discharge Recommendations: Home with home hea History related to current admission:  Patient is 74year old male admitted on 7/26/2018 from home with history of L hip OA, currently s/p L RUTH ANN. Therapy significant co-morbidities include CKD, HTN, R rotator cuff repair.     Problem List[AG.1]  Active Prob -   Putting on and taking off regular lower body clothing?: None  -   Bathing (including washing, rinsing, drying)?: None  -   Toileting, which includes using toilet, bedpan or urinal? : None  -   Putting on and taking off regular upper body clothing?: Non training; Compensatory technique education  Rehab Potential : Good[AG.2]    OT Goals:[AG.1]     ADL GOALS   Patient will perform Lower Body Dressing with supervision (just socks/shoes left to assess) MET  Patient will perform Toileting with supervision MET 5/1/2014: COLONOSCOPY      Comment: Procedure: COLONOSCOPY;  Surgeon: Sean Guadalupe MD;  Location: [facility] ENDOSCOPY  10/23/2017: COLONOSCOPY N/A      Comment: Procedure: COLONOSCOPY;  Surgeon: Sean Guadalupe MD;  Location: [facility] ACTIVITY TOLERANCE[BW.1]   O2 Saturation: 95%  Room air  No shortness of breath[BW.2]    ACTIVITIES OF DAILY LIVING ASSESSMENT  AM-PAC ‘6-Clicks’ Inpatient Daily Activity Short Form  How much help from another person does the patient currently need…  -   P Patient End of Session: Up in chair;Needs met;Call light within reach;RN aware of session/findings; All patient questions and concerns addressed;SCDs in place; Ice applied; Family present    ASSESSMENT[BW.1]   Patient is a[BW.3 74year old[BW.3]  male admitt Rehab Potential : Good  Frequency (Obs): 5x/week  Number of Visits to Meet Established Goals: 2    ADL GOALS   Patient will perform Lower Body Dressing with supervision (just socks/shoes left to assess)  Patient will perform Toileting with supervision    F 8/21/2018 9:00 AM Moises Bates, PT [facility] [address] [place] [facility]    8/24/2018 9:30 AM Moises Bates, PT [facility]    8/28/2018 9:00 AM Moises Bates, PT [facility]

## (undated) NOTE — IP AVS SNAPSHOT
[address]            (For Outpatient Use Only) Initial Admit Date: 7/26/2018   Inpt/Obs Admit Date: Inpt: 7/26/18 / Obs: N/A   Discharge Date:    Hospital Acct:  [de-identified]   MRN: [de-identified]   CSN: [id number]        ENCOUNTER  Patient Hospital Account Financial Class: Medicare Advantage    July 28, 2018

## (undated) NOTE — LETTER
OUTSIDE TESTING RESULT REQUEST     IMPORTANT: FOR YOUR IMMEDIATE ATTENTION  Please FAX all test results listed below to: [phone]     Testing already done on or about: 9/3/24    * * * * If testing is NOT complete, arrange with patient A.S.A.P. * * * *      Patient Name: Higinio Perea  Surgery Date: 2024  Medical Record: [id number]  CSN: [id number]  : 1945 - A: 79 y     Sex: male  Surgeon(s):  Lombardi, John A, MD  Procedure: RIGHT ANTERIOR TOTAL HIP ARTHROPLASTY  Anesthesia Type: Spinal     Surgeon: Lombardi, John A, MD     The following Testing and Time Line are REQUIRED PER ANESTHESIA     EKG READ AND SIGNED WITHIN   90 days      Thank You,   Sent by:chrissie